# Patient Record
Sex: MALE | Race: WHITE | NOT HISPANIC OR LATINO | Employment: FULL TIME | URBAN - METROPOLITAN AREA
[De-identification: names, ages, dates, MRNs, and addresses within clinical notes are randomized per-mention and may not be internally consistent; named-entity substitution may affect disease eponyms.]

---

## 2020-01-14 ENCOUNTER — HOSPITAL ENCOUNTER (EMERGENCY)
Facility: HOSPITAL | Age: 20
Discharge: HOME | End: 2020-01-14
Attending: EMERGENCY MEDICINE | Admitting: EMERGENCY MEDICINE
Payer: COMMERCIAL

## 2020-01-14 ENCOUNTER — OFFICE VISIT (OUTPATIENT)
Dept: URGENT CARE | Facility: MEDICAL CENTER | Age: 20
End: 2020-01-14
Payer: COMMERCIAL

## 2020-01-14 VITALS
OXYGEN SATURATION: 98 % | TEMPERATURE: 98.8 F | HEIGHT: 72 IN | HEART RATE: 77 BPM | BODY MASS INDEX: 19.23 KG/M2 | SYSTOLIC BLOOD PRESSURE: 134 MMHG | DIASTOLIC BLOOD PRESSURE: 84 MMHG | RESPIRATION RATE: 18 BRPM | WEIGHT: 142 LBS

## 2020-01-14 VITALS
WEIGHT: 142 LBS | HEART RATE: 62 BPM | TEMPERATURE: 98.8 F | RESPIRATION RATE: 18 BRPM | SYSTOLIC BLOOD PRESSURE: 159 MMHG | BODY MASS INDEX: 19.26 KG/M2 | OXYGEN SATURATION: 100 % | DIASTOLIC BLOOD PRESSURE: 85 MMHG

## 2020-01-14 DIAGNOSIS — F19.10 SUBSTANCE ABUSE (HCC): Primary | ICD-10-CM

## 2020-01-14 DIAGNOSIS — R41.0 CONFUSION: Primary | ICD-10-CM

## 2020-01-14 DIAGNOSIS — T50.905A MEDICATION ADVERSE EFFECT, INITIAL ENCOUNTER: ICD-10-CM

## 2020-01-14 LAB
ALBUMIN SERPL BCP-MCNC: 4.1 G/DL (ref 3.5–5)
ALP SERPL-CCNC: 131 U/L (ref 46–116)
ALT SERPL W P-5'-P-CCNC: 24 U/L (ref 12–78)
AMPHETAMINES SERPL QL SCN: NEGATIVE
ANION GAP SERPL CALCULATED.3IONS-SCNC: 9 MMOL/L (ref 4–13)
APAP SERPL-MCNC: <2 UG/ML (ref 10–20)
AST SERPL W P-5'-P-CCNC: 20 U/L (ref 5–45)
BARBITURATES UR QL: NEGATIVE
BASOPHILS # BLD AUTO: 0.03 THOUSANDS/ΜL (ref 0–0.1)
BASOPHILS NFR BLD AUTO: 1 % (ref 0–1)
BENZODIAZ UR QL: NEGATIVE
BILIRUB SERPL-MCNC: 0.26 MG/DL (ref 0.2–1)
BUN SERPL-MCNC: 20 MG/DL (ref 5–25)
CALCIUM SERPL-MCNC: 9.3 MG/DL (ref 8.3–10.1)
CHLORIDE SERPL-SCNC: 103 MMOL/L (ref 100–108)
CK MB SERPL-MCNC: 2.2 NG/ML (ref 0–5)
CK MB SERPL-MCNC: <1 % (ref 0–2.5)
CK SERPL-CCNC: 336 U/L (ref 39–308)
CO2 SERPL-SCNC: 28 MMOL/L (ref 21–32)
COCAINE UR QL: NEGATIVE
CREAT SERPL-MCNC: 1.11 MG/DL (ref 0.6–1.3)
EOSINOPHIL # BLD AUTO: 0.04 THOUSAND/ΜL (ref 0–0.61)
EOSINOPHIL NFR BLD AUTO: 1 % (ref 0–6)
ERYTHROCYTE [DISTWIDTH] IN BLOOD BY AUTOMATED COUNT: 11.5 % (ref 11.6–15.1)
ETHANOL SERPL-MCNC: <3 MG/DL (ref 0–3)
GFR SERPL CREATININE-BSD FRML MDRD: 94 ML/MIN/1.73SQ M
GLUCOSE SERPL-MCNC: 118 MG/DL (ref 65–140)
HCT VFR BLD AUTO: 39.5 % (ref 36.5–49.3)
HGB BLD-MCNC: 13.8 G/DL (ref 12–17)
IMM GRANULOCYTES # BLD AUTO: 0.01 THOUSAND/UL (ref 0–0.2)
IMM GRANULOCYTES NFR BLD AUTO: 0 % (ref 0–2)
LYMPHOCYTES # BLD AUTO: 1.46 THOUSANDS/ΜL (ref 0.6–4.47)
LYMPHOCYTES NFR BLD AUTO: 35 % (ref 14–44)
MAGNESIUM SERPL-MCNC: 2.3 MG/DL (ref 1.6–2.6)
MCH RBC QN AUTO: 31.9 PG (ref 26.8–34.3)
MCHC RBC AUTO-ENTMCNC: 34.9 G/DL (ref 31.4–37.4)
MCV RBC AUTO: 91 FL (ref 82–98)
METHADONE UR QL: NEGATIVE
MONOCYTES # BLD AUTO: 0.53 THOUSAND/ΜL (ref 0.17–1.22)
MONOCYTES NFR BLD AUTO: 13 % (ref 4–12)
NEUTROPHILS # BLD AUTO: 2.06 THOUSANDS/ΜL (ref 1.85–7.62)
NEUTS SEG NFR BLD AUTO: 50 % (ref 43–75)
NRBC BLD AUTO-RTO: 0 /100 WBCS
OPIATES UR QL SCN: NEGATIVE
PCP UR QL: NEGATIVE
PLATELET # BLD AUTO: 221 THOUSANDS/UL (ref 149–390)
PMV BLD AUTO: 9.7 FL (ref 8.9–12.7)
POTASSIUM SERPL-SCNC: 3.9 MMOL/L (ref 3.5–5.3)
PROT SERPL-MCNC: 7.3 G/DL (ref 6.4–8.2)
RBC # BLD AUTO: 4.32 MILLION/UL (ref 3.88–5.62)
SALICYLATES SERPL-MCNC: <3 MG/DL (ref 3–20)
SODIUM SERPL-SCNC: 140 MMOL/L (ref 136–145)
THC UR QL: POSITIVE
WBC # BLD AUTO: 4.13 THOUSAND/UL (ref 4.31–10.16)

## 2020-01-14 PROCEDURE — 80307 DRUG TEST PRSMV CHEM ANLYZR: CPT | Performed by: EMERGENCY MEDICINE

## 2020-01-14 PROCEDURE — 82550 ASSAY OF CK (CPK): CPT | Performed by: EMERGENCY MEDICINE

## 2020-01-14 PROCEDURE — 99283 EMERGENCY DEPT VISIT LOW MDM: CPT

## 2020-01-14 PROCEDURE — 99213 OFFICE O/P EST LOW 20 MIN: CPT | Performed by: PHYSICIAN ASSISTANT

## 2020-01-14 PROCEDURE — 85025 COMPLETE CBC W/AUTO DIFF WBC: CPT | Performed by: EMERGENCY MEDICINE

## 2020-01-14 PROCEDURE — 80329 ANALGESICS NON-OPIOID 1 OR 2: CPT | Performed by: EMERGENCY MEDICINE

## 2020-01-14 PROCEDURE — 99284 EMERGENCY DEPT VISIT MOD MDM: CPT | Performed by: EMERGENCY MEDICINE

## 2020-01-14 PROCEDURE — 36415 COLL VENOUS BLD VENIPUNCTURE: CPT | Performed by: EMERGENCY MEDICINE

## 2020-01-14 PROCEDURE — 83735 ASSAY OF MAGNESIUM: CPT | Performed by: EMERGENCY MEDICINE

## 2020-01-14 PROCEDURE — 82553 CREATINE MB FRACTION: CPT | Performed by: EMERGENCY MEDICINE

## 2020-01-14 PROCEDURE — 80053 COMPREHEN METABOLIC PANEL: CPT | Performed by: EMERGENCY MEDICINE

## 2020-01-14 PROCEDURE — 93005 ELECTROCARDIOGRAM TRACING: CPT

## 2020-01-14 PROCEDURE — 80320 DRUG SCREEN QUANTALCOHOLS: CPT | Performed by: EMERGENCY MEDICINE

## 2020-01-14 NOTE — PROGRESS NOTES
[address]        NAME: Rochelle Shukla is a 22 y o  male  : 2000    MRN: [id number]  DATE: 2020  TIME: 6:49 PM    Assessment and Plan   Confusion [R41 0]  1  Confusion           Patient Instructions      confusion  Patient referred to ER for further evaluation  Follow up with PCP in 3-5 days  Proceed to  ER if symptoms worsen  Chief Complaint     Chief Complaint   Patient presents with    Confusion     Pt states he took Zoloft x 3 days to get high along with marijuana: 200mg, 300mg, 200mg  Pt states since then, he has confusion, muscle spasms, and restlessness  Pt also experienced diarrhea and muscle twitching which pt states, has resolved  History of Present Illness       23 y/o male presents c/o muscle twitching, fatigue and confusion x 4 days  States he had been taking zoloft and marijuana 5 days ago  Patient states that due to the muscle spasms he left work today  Denies chest pain, SOB, fever, diaphoresis, fever         Review of Systems   Review of Systems   Constitutional: Negative  HENT: Negative  Eyes: Negative  Respiratory: Negative  Negative for apnea, cough, choking, chest tightness, shortness of breath, wheezing and stridor  Cardiovascular: Negative  Negative for chest pain  Psychiatric/Behavioral: Positive for confusion  Current Medications     No current outpatient medications on file  Current Allergies     Allergies as of 2020    (No Known Allergies)            The following portions of the patient's history were reviewed and updated as appropriate: allergies, current medications, past family history, past medical history, past social history, past surgical history and problem list      History reviewed  No pertinent past medical history  History reviewed  No pertinent surgical history      Family History   Problem Relation Age of Onset    No Known Problems Mother     Hypertension Father     Cancer Father Medications have been verified  Objective   /84   Pulse 77   Temp 98 8 °F (37 1 °C) (Temporal)   Resp 18   Ht 6' (1 829 m)   Wt 64 4 kg (142 lb)   SpO2 98%   BMI 19 26 kg/m²        Physical Exam     Physical Exam   Constitutional: He is oriented to person, place, and time  He appears well-developed and well-nourished  No distress  HENT:   Head: Normocephalic and atraumatic  Neck: Normal range of motion  Neck supple  Lymphadenopathy:     He has cervical adenopathy  Neurological: He is alert and oriented to person, place, and time  He is not disoriented  No cranial nerve deficit or sensory deficit  He exhibits normal muscle tone  Coordination normal  GCS eye subscore is 4  GCS verbal subscore is 5  GCS motor subscore is 6  Skin: He is not diaphoretic

## 2020-01-15 LAB
ATRIAL RATE: 65 BPM
P AXIS: 21 DEGREES
PR INTERVAL: 140 MS
QRS AXIS: 90 DEGREES
QRSD INTERVAL: 86 MS
QT INTERVAL: 406 MS
QTC INTERVAL: 422 MS
T WAVE AXIS: 25 DEGREES
VENTRICULAR RATE: 65 BPM

## 2020-01-15 PROCEDURE — 93010 ELECTROCARDIOGRAM REPORT: CPT | Performed by: INTERNAL MEDICINE

## 2020-01-15 NOTE — ED PROVIDER NOTES
History  Chief Complaint   Patient presents with    Recreational Drug Use     per pt  "he and his friends were smoking Marijuana and taking Zoloft from Wednesday through Saturday, and it is making him to feel shakey and wierd so he came in to be seen, pt is not prescribed Zoloft  "       History provided by:  Patient   used: No    55-year-old otherwise healthy male presented for evaluation of uncomfortable symptoms after abusing Zoloft and marijuana this week  States he has been smoking marijuana, taking extra Zoloft which he is not prescribed and has been feeling jittery, anxious, feels more forgetful, and is having some sluggish thoughts  States his friend was doing the same thing and was apparently diagnosed with serotonin syndrome earlier today although he reports that he was discharged from the hospital without any further treatment  Patient is afebrile  Well appearing overall on exam with normal vitals  He has a normal neurologic exam   Heart/lung sounds appropriate  None       History reviewed  No pertinent past medical history  History reviewed  No pertinent surgical history  Family History   Problem Relation Age of Onset    No Known Problems Mother     Hypertension Father     Cancer Father      I have reviewed and agree with the history as documented  Social History     Tobacco Use    Smoking status: Never Smoker    Smokeless tobacco: Never Used   Substance Use Topics    Alcohol use: Yes     Comment: occasionally    Drug use: Yes     Types: Marijuana     Comment: last smoked 3 days ago        Review of Systems   Constitutional: Negative for activity change, appetite change, fatigue and fever  Respiratory: Negative for chest tightness and shortness of breath  Gastrointestinal: Negative for abdominal pain, nausea and vomiting  Musculoskeletal: Negative for back pain, myalgias and neck pain  Skin: Negative for color change and rash     Neurological: Negative for weakness and numbness  Psychiatric/Behavioral: The patient is nervous/anxious  All other systems reviewed and are negative  Physical Exam  Physical Exam   Constitutional: He is oriented to person, place, and time  He appears well-developed and well-nourished  No distress  HENT:   Head: Normocephalic  Mouth/Throat: Oropharynx is clear and moist    Cardiovascular: Normal rate, regular rhythm, normal heart sounds and intact distal pulses  Pulmonary/Chest: Effort normal and breath sounds normal    Musculoskeletal: Normal range of motion  He exhibits no edema  Neurological: He is alert and oriented to person, place, and time  No sensory deficit  He exhibits normal muscle tone  Skin: Skin is warm and dry  Psychiatric: He has a normal mood and affect  His behavior is normal    Nursing note and vitals reviewed  Vital Signs  ED Triage Vitals [01/14/20 1954]   Temperature Pulse Respirations Blood Pressure SpO2   98 8 °F (37 1 °C) 77 18 159/85 97 %      Temp Source Heart Rate Source Patient Position - Orthostatic VS BP Location FiO2 (%)   Oral Monitor Sitting Left arm --      Pain Score       --           Vitals:    01/14/20 1954 01/14/20 2115   BP: 159/85    Pulse: 77 62   Patient Position - Orthostatic VS: Sitting          Visual Acuity      ED Medications  Medications - No data to display    Diagnostic Studies  Results Reviewed     Procedure Component Value Units Date/Time    Rapid drug screen, urine [[id number]]  (Abnormal) Collected:  01/14/20 2141    Lab Status:  Final result Specimen:  Urine, Clean Catch Updated:  01/14/20 2156     Amph/Meth UR Negative     Barbiturate Ur Negative     Benzodiazepine Urine Negative     Cocaine Urine Negative     Methadone Urine Negative     Opiate Urine Negative     PCP Ur Negative     THC Urine Positive    Narrative:       Presumptive report  If requested, specimen will be sent to reference lab for confirmation  FOR MEDICAL PURPOSES ONLY     IF CONFIRMATION NEEDED PLEASE CONTACT THE LAB WITHIN 5 DAYS      Drug Screen Cutoff Levels:  AMPHETAMINE/METHAMPHETAMINES  1000 ng/mL  BARBITURATES     200 ng/mL  BENZODIAZEPINES     200 ng/mL  COCAINE      300 ng/mL  METHADONE      300 ng/mL  OPIATES      300 ng/mL  PHENCYCLIDINE     25 ng/mL  THC       50 ng/mL      CKMB [[id number]]  (Normal) Collected:  01/14/20 2050    Lab Status:  Final result Specimen:  Blood from Arm, Right Updated:  01/14/20 2141     CK-MB Index <1 0 %      CK-MB 2 2 ng/mL     Magnesium [[id number]]  (Normal) Collected:  01/14/20 2050    Lab Status:  Final result Specimen:  Blood from Arm, Right Updated:  01/14/20 2138     Magnesium 2 3 mg/dL     CK Total with Reflex CKMB [[id number]]  (Abnormal) Collected:  01/14/20 2050    Lab Status:  Final result Specimen:  Blood from Arm, Right Updated:  01/14/20 2137     Total  U/L     Comprehensive metabolic panel [[id number]]  (Abnormal) Collected:  01/14/20 2050    Lab Status:  Final result Specimen:  Blood from Arm, Right Updated:  01/14/20 2121     Sodium 140 mmol/L      Potassium 3 9 mmol/L      Chloride 103 mmol/L      CO2 28 mmol/L      ANION GAP 9 mmol/L      BUN 20 mg/dL      Creatinine 1 11 mg/dL      Glucose 118 mg/dL      Calcium 9 3 mg/dL      AST 20 U/L      ALT 24 U/L      Alkaline Phosphatase 131 U/L      Total Protein 7 3 g/dL      Albumin 4 1 g/dL      Total Bilirubin 0 26 mg/dL      eGFR 94 ml/min/1 73sq m     Narrative:       Chung guidelines for Chronic Kidney Disease (CKD):     Stage 1 with normal or high GFR (GFR > 90 mL/min/1 73 square meters)    Stage 2 Mild CKD (GFR = 60-89 mL/min/1 73 square meters)    Stage 3A Moderate CKD (GFR = 45-59 mL/min/1 73 square meters)    Stage 3B Moderate CKD (GFR = 30-44 mL/min/1 73 square meters)    Stage 4 Severe CKD (GFR = 15-29 mL/min/1 73 square meters)    Stage 5 End Stage CKD (GFR <15 mL/min/1 73 square meters)  Note: GFR calculation is accurate only with a steady state creatinine    Salicylate level [[id number]]  (Abnormal) Collected:  01/14/20 2050    Lab Status:  Final result Specimen:  Blood from Arm, Right Updated:  50/07/97 9117     Salicylate Lvl <3 mg/dL     Acetaminophen level-If concentration is detectable, please discuss with medical  on call   [[id number]]  (Abnormal) Collected:  01/14/20 2050    Lab Status:  Final result Specimen:  Blood from Arm, Right Updated:  01/14/20 2119     Acetaminophen Level <2 ug/mL     Ethanol [[id number]]  (Normal) Collected:  01/14/20 2050    Lab Status:  Final result Specimen:  Blood from Arm, Right Updated:  01/14/20 2115     Ethanol Lvl <3 mg/dL     CBC and differential [[id number]]  (Abnormal) Collected:  01/14/20 2050    Lab Status:  Final result Specimen:  Blood from Arm, Right Updated:  01/14/20 2101     WBC 4 13 Thousand/uL      RBC 4 32 Million/uL      Hemoglobin 13 8 g/dL      Hematocrit 39 5 %      MCV 91 fL      MCH 31 9 pg      MCHC 34 9 g/dL      RDW 11 5 %      MPV 9 7 fL      Platelets 623 Thousands/uL      nRBC 0 /100 WBCs      Neutrophils Relative 50 %      Immat GRANS % 0 %      Lymphocytes Relative 35 %      Monocytes Relative 13 %      Eosinophils Relative 1 %      Basophils Relative 1 %      Neutrophils Absolute 2 06 Thousands/µL      Immature Grans Absolute 0 01 Thousand/uL      Lymphocytes Absolute 1 46 Thousands/µL      Monocytes Absolute 0 53 Thousand/µL      Eosinophils Absolute 0 04 Thousand/µL      Basophils Absolute 0 03 Thousands/µL                  No orders to display              Procedures  ECG 12 Lead Documentation Only  Date/Time: 1/14/2020 9:00 PM  Performed by: Kota Quesada MD  Authorized by: Kota Quesada MD     Indications / Diagnosis:  Zoloft overdose  ECG reviewed by me, the ED Provider: yes    Patient location:  ED  Rate:     ECG rate:  65  Rhythm:     Rhythm: sinus rhythm    Ectopy:     Ectopy: none    QRS:     QRS axis:  Normal  Conduction:     Conduction: normal    ST segments:     ST segments:  Normal  T waves:     T waves: normal               ED Course                               MDM  Number of Diagnoses or Management Options  Medication adverse effect, initial encounter: new and requires workup  Substance abuse [facility]): new and requires workup  Diagnosis management comments: 55-year-old male presented for evaluation of feeling anxious, some forgetfulness in the setting taking Zoloft for recreation over the last few days as well as smoking marijuana  He became very concerned because his friend was apparently diagnosed with serotonin syndrome although this is unlikely because he notes that his friend was discharged from the emergency department  Patient is afebrile  Has no neurologic deficits  No myoclonus  Lab work unremarkable  Suspect adverse effects from the medication  Advised discontinuing recreational use  Stable for discharge  Amount and/or Complexity of Data Reviewed  Clinical lab tests: ordered and reviewed    Patient Progress  Patient progress: stable        Disposition  Final diagnoses:   Substance abuse ([facility] [address] )   Medication adverse effect, initial encounter     Time reflects when diagnosis was documented in both MDM as applicable and the Disposition within this note     Time User Action Codes Description Comment    1/14/2020  9:42 PM Isha SKELTON Add [F19 10] Substance abuse ([facility] [address] )     1/14/2020  9:42 PM [facility] [address] Medication adverse effect, initial encounter       ED Disposition     ED Disposition Condition Date/Time Comment    Discharge Stable Tue Jan 14, 2020  9:42 PM Félix Grounds discharge to home/self care              Follow-up Information     Follow up With Specialties Details Why Contact Info Additional Information    [facility]   [address] [address]  [phone]  ED, [facility] [address] [place] [address], [address]          There are no discharge medications for this patient  No discharge procedures on file      ED Provider  Electronically Signed by           Adriano Vega MD  01/14/20 5425

## 2020-02-21 VITALS
BODY MASS INDEX: 19.77 KG/M2 | HEIGHT: 72 IN | HEART RATE: 106 BPM | WEIGHT: 146 LBS | DIASTOLIC BLOOD PRESSURE: 61 MMHG | SYSTOLIC BLOOD PRESSURE: 168 MMHG

## 2020-02-21 DIAGNOSIS — M76.32 IT BAND SYNDROME, LEFT: Primary | ICD-10-CM

## 2020-02-21 PROCEDURE — 99203 OFFICE O/P NEW LOW 30 MIN: CPT | Performed by: ORTHOPAEDIC SURGERY

## 2020-02-21 RX ORDER — MELOXICAM 15 MG/1
15 TABLET ORAL DAILY
Qty: 30 TABLET | Refills: 0 | Status: SHIPPED | OUTPATIENT
Start: 2020-02-21 | End: 2021-02-15

## 2020-02-21 NOTE — PROGRESS NOTES
Patient Name:  Jerald Meeks  MRN:  [id number]    Assessment & Plan     Left Knee, IT band syndrome    1  Will prescribe formal physical therapy for the above mentioned diagnosis and supplement with a home exercise program  2  Mobic once daily was prescribed for the patient for pain relief  3  Wear hinged knee brace as needed  4  Follow up in 4 weeks for re evaluation of symptoms  Consider MRI if symptoms persist    Chief Complaint     Left Knee Pain    History of the Present Illness     Jerald Meeks is a 22 y o  male presents with a chief complaint of left knee pain  The pain began several year(s) ago and is not associated with an acute injury  Patient states that he does work at [employer] and constantly squats and lifts product  He also plays basketball  The patient describes the pain as aching and dull and 3 out of 10 in intensity  It is intermittent in timing, and localizes the pain to the IT band, globally  The pain is worse with activities, ascending stairs, descending stairs and squatting and relieved with rest, ice, the use of a brace/sleeve, avoiding painful activities  He denies mechanical symptoms such as locking and catching  He denies instability of the knee  Patient has not had recent treatment  Patient does state that he was treated in the past for patellar tendinitis and wear a patellar tendon strap with benefit while he plays basketball  Physical Exam     /61   Pulse (!) 106   Ht 6' (1 829 m)   Wt 66 2 kg (146 lb)   BMI 19 80 kg/m²     Left  knee:  Soft tissue swelling: None  No erythema  Effusion: None  Tenderness to palpation: None  Range of motion:  Extension: 0  Flexion: 130  Lachman test: Stable  Valgus stress: Stable  Varus stress: Stable  Posterior drawer test: Stable  Jennifer's test: Negative  Patellar grind test: Negative    Eyes: No scleral icterus  Neck: Supple  Lungs: Normal respiratory effort    Cardiovascular: Capillary refill is less than 2 seconds  Skin: Intact without erythema  Neurologic: Sensation intact to light touch  Psychiatric: Mood and affect are appropriate  History reviewed  No pertinent past medical history  History reviewed  No pertinent surgical history  No Known Allergies    No current outpatient medications on file prior to visit  No current facility-administered medications on file prior to visit  Social History     Tobacco Use    Smoking status: Never Smoker    Smokeless tobacco: Never Used   Substance Use Topics    Alcohol use: Yes     Comment: occasionally    Drug use: Yes     Types: Marijuana     Comment: last smoked 3 days ago       Family History   Problem Relation Age of Onset    No Known Problems Mother     Hypertension Father     Cancer Father        Review of Systems     As stated in the HPI  All other systems were reviewed and are negative        Scribe Attestation    I,:   Marla Castellanos am acting as a scribe while in the presence of the attending physician :        I,:   Adolfo Gowers, MD personally performed the services described in this documentation    as scribed in my presence :

## 2021-01-25 ENCOUNTER — APPOINTMENT (OUTPATIENT)
Dept: RADIOLOGY | Age: 21
End: 2021-01-25
Payer: COMMERCIAL

## 2021-01-25 ENCOUNTER — OFFICE VISIT (OUTPATIENT)
Dept: PODIATRY | Facility: CLINIC | Age: 21
End: 2021-01-25
Payer: COMMERCIAL

## 2021-01-25 VITALS — HEIGHT: 72 IN | BODY MASS INDEX: 19.77 KG/M2 | WEIGHT: 146 LBS

## 2021-01-25 DIAGNOSIS — M79.675 PAIN IN TOE OF LEFT FOOT: ICD-10-CM

## 2021-01-25 DIAGNOSIS — M20.12 HALLUX ABDUCTO VALGUS, LEFT: ICD-10-CM

## 2021-01-25 DIAGNOSIS — M20.12 HALLUX ABDUCTO VALGUS, LEFT: Primary | ICD-10-CM

## 2021-01-25 PROCEDURE — 73620 X-RAY EXAM OF FOOT: CPT

## 2021-01-25 PROCEDURE — 99203 OFFICE O/P NEW LOW 30 MIN: CPT | Performed by: PODIATRIST

## 2021-01-25 NOTE — PROGRESS NOTES
Assessment/Plan:         Diagnoses and all orders for this visit:    Hallux abducto valgus, left  Comments:  IPJ deformity  Likely fusion IPJ vs correction  Send for XRays  Orders:  -     XR foot 2 vw left; Future  -     Case request operating room: ARTHRODESIS / [place] left great toe interphalangeal joint; Standing  -     Case request operating room: ARTHRODESIS / [place] left great toe interphalangeal joint    Pain in toe of left foot  -     XR foot 2 vw left; Future  -     Case request operating room: ARTHRODESIS / Jayde [place] left great toe interphalangeal joint; Standing  -     Case request operating room: ARTHRODESIS / [place]de [place] left great toe interphalangeal joint    Other orders  -     Incentive spirometry; Standing  -     Insert and maintain IV line; Standing  -     Void On-Call to O R ; Standing  -     ceFAZolin (ANCEF) 1,000 mg in dextrose 5 % 100 mL IVPB      Patient presents with 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  He has significant abductus valgus at the interphalangeal joint  This is a rigid deformity  Realistically his only option other than dealing with the pain would be to Orthopedic we straighten the toe  Patient would like to discuss with his bipolar it regarding timing of surgery  I explained he would need to be out of work for probably 6 weeks while in a boot  Patient is sent for weight-bearing x-rays  He will call if he has decided upon surgical correction  Subjective:      Patient ID: Félix Dina is a 23 y o  male  21y/o male presents with "ligament pain" in his foot  HE also reports IT band sydrome for which he is in physical therapy and seeing orthopedics  He works at [employer] and his job involves lifting and squatting, carrying heavy boxes, etc  HE reports no significant PMH  His big toes are crooked  They have been causing some pain  The left is more exaggerated and more painful  Most of the pain is on the bony bump   HE has had this for years but as he's gone to work they bother him more  The following portions of the patient's history were reviewed and updated as appropriate: allergies, current medications, past family history, past medical history, past social history, past surgical history and problem list     Review of Systems   Constitutional: Negative  HENT: Negative for sinus pressure and sinus pain  Respiratory: Negative for cough and shortness of breath  Cardiovascular: Negative for chest pain and leg swelling  Gastrointestinal: Negative for diarrhea, nausea and vomiting  Musculoskeletal: Positive for arthralgias and joint swelling  Skin: Negative for color change and wound  Neurological: Negative for weakness and numbness  Objective:      Ht 6' (1 829 m)   Wt 66 2 kg (146 lb)   BMI 19 80 kg/m²          Physical Exam      Vitals reviewed    Constitutional: Patient is not distressed  Patient is well developed  Patient is not obese  Vascular: Dorsalis pedis and posterior tibial pulses palpable  Capillary refill time within normal limits to all digits  No erythema  No edema  No significant varicosities  Dermatology: No rash  No open lesions  Present pedal hair  Skin has healthy turgor  Musculoskeletal: Normal range of motion to ankle, subtalar joint, and midtarsal joint  Normal range of motion first MTPJ  Manual muscle testing 5 out of 5 for inversion/eversion/dorsiflexion/plantarflexion  On stance patients feet are generally rectus  Rigid left hallux IPJ abductus  NO MTPJ instability  THe IPJ has no crepitus, just large rigid abductus varus deformity  THe medial IPJ is very prominent  Neurological: Monofilament sensation is intact  Vibratory sensation is intact  Achilles reflex is normal    Proprioception is normal    Respiratory: Normal respiratory effort, no distress    Psych: Patient is AAOx3  Normal mood       Lymphatic: nonpalpable popliteal lymph nodes  Nonpalpable groin lymph nodes

## 2021-01-27 RX ORDER — CEFAZOLIN SODIUM 1 G/50ML
1000 SOLUTION INTRAVENOUS ONCE
Status: CANCELLED | OUTPATIENT
Start: 2021-03-05

## 2021-02-08 ENCOUNTER — TELEPHONE (OUTPATIENT)
Dept: OBGYN CLINIC | Facility: HOSPITAL | Age: 21
End: 2021-02-08

## 2021-02-08 ENCOUNTER — OFFICE VISIT (OUTPATIENT)
Dept: PODIATRY | Facility: CLINIC | Age: 21
End: 2021-02-08
Payer: COMMERCIAL

## 2021-02-08 VITALS — BODY MASS INDEX: 19.64 KG/M2 | HEIGHT: 72 IN | WEIGHT: 145 LBS

## 2021-02-08 DIAGNOSIS — M20.12 HALLUX ABDUCTO VALGUS, LEFT: Primary | ICD-10-CM

## 2021-02-08 DIAGNOSIS — M20.32 ACQUIRED HALLUX MALLEUS OF LEFT FOOT: ICD-10-CM

## 2021-02-08 PROCEDURE — 99213 OFFICE O/P EST LOW 20 MIN: CPT | Performed by: PODIATRIST

## 2021-02-08 NOTE — H&P (VIEW-ONLY)
Assessment/Plan:         Diagnoses and all orders for this visit:    Hallux abducto valgus, left    Acquired hallux malleus of left foot        Diagnosis and options discussed  Consent was signed for fusion of left interphalangeal joint of the great toe  We will have to do angular correction to the deformity in both the transverse and frontal plane  I explained the procedure in detail and postop recovery as can best be predicted  Alternatives, risks, complications discussed  Questions answered  Discussed appropriate use of narcotic medication and the risks of these medications  Explained to the patient he will be in a boot following surgery for approximately 6 weeks  Patient does not need DVT prophylaxis for this procedure as he will be weight-bearing but we did discuss signs and symptoms of blood clot/pulmonary embolism  Extra time needed today's visit for counseling  Subjective:      Patient ID: Nhi Kim is a 23 y o  male  19y/o male presents with "ligament pain" in his foot  HE also reports IT band sydrome for which he is in physical therapy and seeing orthopedics  He works at [employer] and his job involves lifting and squatting, carrying heavy boxes, etc  HE reports no significant PMH      His big toes are crooked  They have been causing some pain  The left is more exaggerated and more painful  Most of the pain is on the bony bump  HE has had this for years but as he's gone to work they bother him more  UPDATE 2/8/21:  Patient did call to planned surgery from March 5th  He would like to see is x-rays and has some questions regarding the surgery  We will also sign consent today  He reports no other changes in still is getting 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        The following portions of the patient's history were reviewed and updated as appropriate: allergies, current medications, past family history, past medical history, past social history, past surgical history and problem list     Review of Systems   Constitutional: Negative  HENT: Negative for sinus pressure and sinus pain  Respiratory: Negative for cough and shortness of breath  Cardiovascular: Negative for chest pain and leg swelling  Gastrointestinal: Negative for diarrhea, nausea and vomiting  Musculoskeletal: Positive for arthralgias and joint swelling  Skin: Negative for color change and wound  Neurological: Negative for weakness and numbness  Psychiatric/Behavioral: The patient is nervous/anxious  Objective:      Ht 6' (1 829 m)   Wt 65 8 kg (145 lb)   BMI 19 67 kg/m²          Physical Exam    Vitals reviewed    Constitutional: Patient is not distressed  Patient is well developed  Patient is   Not obese  Vascular: Dorsalis pedis and posterior tibial pulses palpable  Capillary refill time within normal limits to all digits  No erythema  No edema  No significant varicosities  Dermatology: No rash  No open lesions  Present pedal hair  Skin has healthy turgor  Musculoskeletal: Normal range of motion to ankle, subtalar joint, and midtarsal joint  Normal range of motion first MTPJ  Manual muscle testing 5 out of 5 for inversion/eversion/dorsiflexion/plantarflexion  On stance patients feet are generally rectus  Bilateral great toe interphalangeal joint shows subluxed lateral   Valgusdeviation of the distal phalanx  This deformity is rigid  There is no crepitus with range of motion but the deformity is moderate to severe  The great toe is rubbing against the 2nd digit distally  Neurological: Monofilament sensation is intact  Vibratory sensation is intact  Achilles reflex is normal    Proprioception is normal    Respiratory: Normal respiratory effort, no distress    Psych: Patient is AAOx3  Normal mood       Lymphatic: nonpalpable popliteal lymph nodes  Nonpalpable groin lymph nodes      XRay  two views of the  Left foot personally read by   Juanjose Ordonez in office today and discussed with patient:  1   the distal phalanx is abducted and valgus rotation with approximately 50% subluxation of the interphalangeal joint  No significant  Sagittal plane deformity,  All deformity seems to be transverse and frontal   I did review these findings with the patient personally

## 2021-02-08 NOTE — PROGRESS NOTES
Assessment/Plan:         Diagnoses and all orders for this visit:    Hallux abducto valgus, left    Acquired hallux malleus of left foot        Diagnosis and options discussed  Consent was signed for fusion of left interphalangeal joint of the great toe  We will have to do angular correction to the deformity in both the transverse and frontal plane  I explained the procedure in detail and postop recovery as can best be predicted  Alternatives, risks, complications discussed  Questions answered  Discussed appropriate use of narcotic medication and the risks of these medications  Explained to the patient he will be in a boot following surgery for approximately 6 weeks  Patient does not need DVT prophylaxis for this procedure as he will be weight-bearing but we did discuss signs and symptoms of blood clot/pulmonary embolism  Extra time needed today's visit for counseling  Subjective:      Patient ID: Beverly Connolly is a 23 y o  male  21y/o male presents with "ligament pain" in his foot  HE also reports IT band sydrome for which he is in physical therapy and seeing orthopedics  He works at [employer] and his job involves lifting and squatting, carrying heavy boxes, etc  HE reports no significant PMH      His big toes are crooked  They have been causing some pain  The left is more exaggerated and more painful  Most of the pain is on the bony bump  HE has had this for years but as he's gone to work they bother him more  UPDATE 2/8/21:  Patient did call to planned surgery from March 5th  He would like to see is x-rays and has some questions regarding the surgery  We will also sign consent today  He reports no other changes in still is getting 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        The following portions of the patient's history were reviewed and updated as appropriate: allergies, current medications, past family history, past medical history, past social history, past surgical history and problem list     Review of Systems   Constitutional: Negative  HENT: Negative for sinus pressure and sinus pain  Respiratory: Negative for cough and shortness of breath  Cardiovascular: Negative for chest pain and leg swelling  Gastrointestinal: Negative for diarrhea, nausea and vomiting  Musculoskeletal: Positive for arthralgias and joint swelling  Skin: Negative for color change and wound  Neurological: Negative for weakness and numbness  Psychiatric/Behavioral: The patient is nervous/anxious  Objective:      Ht 6' (1 829 m)   Wt 65 8 kg (145 lb)   BMI 19 67 kg/m²          Physical Exam    Vitals reviewed    Constitutional: Patient is not distressed  Patient is well developed  Patient is   Not obese  Vascular: Dorsalis pedis and posterior tibial pulses palpable  Capillary refill time within normal limits to all digits  No erythema  No edema  No significant varicosities  Dermatology: No rash  No open lesions  Present pedal hair  Skin has healthy turgor  Musculoskeletal: Normal range of motion to ankle, subtalar joint, and midtarsal joint  Normal range of motion first MTPJ  Manual muscle testing 5 out of 5 for inversion/eversion/dorsiflexion/plantarflexion  On stance patients feet are generally rectus  Bilateral great toe interphalangeal joint shows subluxed lateral   Valgusdeviation of the distal phalanx  This deformity is rigid  There is no crepitus with range of motion but the deformity is moderate to severe  The great toe is rubbing against the 2nd digit distally  Neurological: Monofilament sensation is intact  Vibratory sensation is intact  Achilles reflex is normal    Proprioception is normal    Respiratory: Normal respiratory effort, no distress    Psych: Patient is AAOx3  Normal mood       Lymphatic: nonpalpable popliteal lymph nodes  Nonpalpable groin lymph nodes      XRay  two views of the  Left foot personally read by   Leida Lima in office today and discussed with patient:  1   the distal phalanx is abducted and valgus rotation with approximately 50% subluxation of the interphalangeal joint  No significant  Sagittal plane deformity,  All deformity seems to be transverse and frontal   I did review these findings with the patient personally

## 2021-02-15 ENCOUNTER — CONSULT (OUTPATIENT)
Dept: INTERNAL MEDICINE CLINIC | Facility: CLINIC | Age: 21
End: 2021-02-15
Payer: COMMERCIAL

## 2021-02-15 VITALS
WEIGHT: 144.8 LBS | OXYGEN SATURATION: 99 % | TEMPERATURE: 97.7 F | HEART RATE: 80 BPM | SYSTOLIC BLOOD PRESSURE: 120 MMHG | HEIGHT: 72 IN | DIASTOLIC BLOOD PRESSURE: 70 MMHG | BODY MASS INDEX: 19.61 KG/M2

## 2021-02-15 DIAGNOSIS — Z01.818 PRE-OP EVALUATION: Primary | ICD-10-CM

## 2021-02-15 DIAGNOSIS — M20.12 HALLUX ABDUCTO VALGUS, LEFT: ICD-10-CM

## 2021-02-15 PROCEDURE — 99242 OFF/OP CONSLTJ NEW/EST SF 20: CPT | Performed by: NURSE PRACTITIONER

## 2021-02-15 NOTE — PROGRESS NOTES
Assessment/Plan:    Pre-op evaluation  Patient cleared for surgery  Will have staff fax consult  Hallux abducto valgus, left  Scheduled for surgery on 3/5/21  Diagnoses and all orders for this visit:    Pre-op evaluation    Hallux abducto valgus, left          Subjective:    Lynne Otero is a 23y o  year old male who presents to the office today for a preoperative consultation at the request of surgeon Dr Rufus Hardy who plans on performing surgery on Hallux abducto valgus of left foot on March 5  This consultation is requested for the specific conditions prompting preoperative evaluation (i e  because of potential affect on operative risk)  Planned anesthesia: general  The patient has the following known anesthesia issues: denies  Patients bleeding risk: no recent abnormal bleeding  Patient does not have objections to receiving blood products if needed  Patient is able to walk 4 blocks without symptoms  Patient is able to walk up 2 flights of stairs without symptoms  Significant past medical history includes:  Patient denies any PMH  Tobacco use: denies  Alcohol use: socially  Illicit drug use: denies    Symptoms:   Easy bleeding: no  Easy bruising: no  Frequent nose bleeds: no  Chest pain: no  Cough: no  Dyspnea on exertion:no  Edema: no  Palpitations: no  Wheezing: no    Living situation: Patient lives with his girl friend in a one story home  His girlfriend will be caring for him after surgery  hedoes not have post-op concerns  The following portions of the patient's history were reviewed and updated as appropriate: allergies, current medications, past family history, past medical history, past social history, past surgical history and problem list     Review of Systems  Review of Systems   Constitutional: Negative for activity change, appetite change, chills, diaphoresis and fever     HENT: Negative for congestion, ear discharge, ear pain, postnasal drip, rhinorrhea, sinus pressure, sinus pain and sore throat  Eyes: Negative for pain, discharge, itching and visual disturbance  Respiratory: Negative for cough, chest tightness, shortness of breath and wheezing  Cardiovascular: Negative for chest pain, palpitations and leg swelling  Gastrointestinal: Negative for abdominal pain, blood in stool, constipation, diarrhea, nausea and vomiting  Endocrine: Negative for polydipsia, polyphagia and polyuria  Genitourinary: Negative for difficulty urinating, dysuria, hematuria and urgency  Musculoskeletal: Negative for arthralgias, back pain and neck pain  Skin: Negative for rash and wound  Neurological: Negative for dizziness, weakness, numbness and headaches  Past Medical History:   Diagnosis Date    Hallux abducto valgus, left      Past Surgical History:   Procedure Laterality Date    WISDOM TOOTH EXTRACTION       Social History     Tobacco Use    Smoking status: Never Smoker    Smokeless tobacco: Never Used   Substance Use Topics    Alcohol use: Yes     Alcohol/week: 3 0 standard drinks     Types: 3 Glasses of wine per week     Frequency: 2-4 times a month     Drinks per session: 1 or 2     Binge frequency: Never     Comment: weekly    Drug use: Not Currently     Types: Marijuana     Comment: last smoked in the past month     Family History   Problem Relation Age of Onset    Arthritis Mother     Hypertension Father     Cancer Father      Patient has no known allergies  Current Outpatient Medications   Medication Sig Dispense Refill    Multiple Vitamin (multivitamin) tablet Take 1 tablet by mouth daily       No current facility-administered medications for this visit          Objective:       /70 (BP Location: Right arm, Patient Position: Sitting, Cuff Size: Adult)   Pulse 80   Temp 97 7 °F (36 5 °C) (Tympanic)   Ht 6' 0 13" (1 832 m)   Wt 65 7 kg (144 lb 12 8 oz)   SpO2 99% Comment: Room air  BMI 19 57 kg/m²   Physical Exam  Constitutional: General: He is not in acute distress  Appearance: He is well-developed  He is not diaphoretic  HENT:      Head: Normocephalic and atraumatic  Right Ear: External ear normal       Left Ear: External ear normal       Nose: Nose normal       Mouth/Throat:      Pharynx: No oropharyngeal exudate  Eyes:      General:         Right eye: No discharge  Left eye: No discharge  Conjunctiva/sclera: Conjunctivae normal       Pupils: Pupils are equal, round, and reactive to light  Neck:      Musculoskeletal: Normal range of motion and neck supple  Thyroid: No thyromegaly  Cardiovascular:      Rate and Rhythm: Normal rate and regular rhythm  Heart sounds: Normal heart sounds  No murmur  No friction rub  No gallop  Pulmonary:      Effort: Pulmonary effort is normal  No respiratory distress  Breath sounds: Normal breath sounds  No stridor  No wheezing or rales  Abdominal:      General: Bowel sounds are normal  There is no distension  Palpations: Abdomen is soft  Tenderness: There is no abdominal tenderness  Lymphadenopathy:      Cervical: No cervical adenopathy  Skin:     General: Skin is warm and dry  Findings: No erythema or rash  Neurological:      Mental Status: He is alert and oriented to person, place, and time  Psychiatric:         Behavior: Behavior normal          Thought Content: Thought content normal          Judgment: Judgment normal               Cardiographics  ECG: N/A    Lab Review   not applicable     Assessment:     23 y o  male with planned surgery as above  Known risk factors for perioperative complications: None      Cardiac Risk Estimation:     Wisam Adams is cleared from a cardiovascular standpoint to proceed with surgery  He is not risk from a cardiovascular standpoint at this time without any additional cardiac testing  Reevaluation needed if he should present with symptoms prior to surgery        Plan:  Preoperative workup as follows none  Change in medication regimen before surgery: As per surgeon

## 2021-02-22 ENCOUNTER — TELEPHONE (OUTPATIENT)
Dept: INTERNAL MEDICINE CLINIC | Facility: CLINIC | Age: 21
End: 2021-02-22

## 2021-02-25 ENCOUNTER — TELEPHONE (OUTPATIENT)
Dept: INTERNAL MEDICINE CLINIC | Facility: CLINIC | Age: 21
End: 2021-02-25

## 2021-02-25 RX ORDER — MULTIVITAMIN
1 TABLET ORAL DAILY
COMMUNITY
End: 2022-04-25

## 2021-02-25 NOTE — TELEPHONE ENCOUNTER
Hazel Dorman from podiatry office called to clarify that he did or did not have a ekg done  It states in your note it was reviewed  But no ekg done in our office   Please call pam back at [phone]

## 2021-02-25 NOTE — PRE-PROCEDURE INSTRUCTIONS
Pre-Surgery Instructions:   Medication Instructions    Multiple Vitamin (multivitamin) tablet Instructed patient per Anesthesia Guidelines  All pre-op instructions per [facility] My Surgical Experience w/ pt verb understanding  Knows NPO after MN  Inst no ASA, NSAIDs , otc vit/supp for 7 days pre-op  Inst no etoh or marijuana for 24 hrs before sx  Has showering instructions from , office, inst to get CHG , reviewed process at time of phone call  Enc to wear comfortable easy on/easy of clothing on DOS, abhijit his pants, eg  loose sweat pants ideal to get over foot dsg  All pt questions answered to his verb understanding  States no further concerns at this time

## 2021-02-26 NOTE — TELEPHONE ENCOUNTER
No EKG was done please call and notify  Also patient did not establish care yet, please remove me as PCP

## 2021-03-04 ENCOUNTER — ANESTHESIA EVENT (OUTPATIENT)
Dept: PERIOP | Facility: HOSPITAL | Age: 21
End: 2021-03-04
Payer: COMMERCIAL

## 2021-03-05 ENCOUNTER — HOSPITAL ENCOUNTER (OUTPATIENT)
Facility: HOSPITAL | Age: 21
Setting detail: OUTPATIENT SURGERY
Discharge: HOME | End: 2021-03-05
Attending: PODIATRIST | Admitting: PODIATRIST
Payer: COMMERCIAL

## 2021-03-05 ENCOUNTER — ANESTHESIA (OUTPATIENT)
Dept: PERIOP | Facility: HOSPITAL | Age: 21
End: 2021-03-05
Payer: COMMERCIAL

## 2021-03-05 ENCOUNTER — APPOINTMENT (OUTPATIENT)
Dept: RADIOLOGY | Facility: HOSPITAL | Age: 21
End: 2021-03-05
Payer: COMMERCIAL

## 2021-03-05 VITALS
RESPIRATION RATE: 16 BRPM | HEART RATE: 72 BPM | BODY MASS INDEX: 19.5 KG/M2 | DIASTOLIC BLOOD PRESSURE: 72 MMHG | WEIGHT: 144 LBS | SYSTOLIC BLOOD PRESSURE: 133 MMHG | HEIGHT: 72 IN | OXYGEN SATURATION: 99 % | TEMPERATURE: 97.2 F

## 2021-03-05 DIAGNOSIS — Z98.890 STATUS POST SURGERY: Primary | ICD-10-CM

## 2021-03-05 PROCEDURE — 28755 FUSION OF BIG TOE JOINT: CPT | Performed by: PODIATRIST

## 2021-03-05 PROCEDURE — 99024 POSTOP FOLLOW-UP VISIT: CPT | Performed by: PODIATRIST

## 2021-03-05 PROCEDURE — 73630 X-RAY EXAM OF FOOT: CPT

## 2021-03-05 PROCEDURE — C1713 ANCHOR/SCREW BN/BN,TIS/BN: HCPCS | Performed by: PODIATRIST

## 2021-03-05 PROCEDURE — C1769 GUIDE WIRE: HCPCS | Performed by: PODIATRIST

## 2021-03-05 PROCEDURE — 76000 FLUOROSCOPY <1 HR PHYS/QHP: CPT | Performed by: PODIATRIST

## 2021-03-05 DEVICE — SCREW COMP 4 X 40MM STD FT: Type: IMPLANTABLE DEVICE | Site: FOOT | Status: FUNCTIONAL

## 2021-03-05 RX ORDER — SODIUM CHLORIDE, SODIUM LACTATE, POTASSIUM CHLORIDE, CALCIUM CHLORIDE 600; 310; 30; 20 MG/100ML; MG/100ML; MG/100ML; MG/100ML
75 INJECTION, SOLUTION INTRAVENOUS CONTINUOUS
Status: DISCONTINUED | OUTPATIENT
Start: 2021-03-05 | End: 2021-03-05 | Stop reason: HOSPADM

## 2021-03-05 RX ORDER — FENTANYL CITRATE 50 UG/ML
INJECTION, SOLUTION INTRAMUSCULAR; INTRAVENOUS AS NEEDED
Status: DISCONTINUED | OUTPATIENT
Start: 2021-03-05 | End: 2021-03-05

## 2021-03-05 RX ORDER — DEXAMETHASONE SODIUM PHOSPHATE 10 MG/ML
INJECTION, SOLUTION INTRAMUSCULAR; INTRAVENOUS AS NEEDED
Status: DISCONTINUED | OUTPATIENT
Start: 2021-03-05 | End: 2021-03-05

## 2021-03-05 RX ORDER — MIDAZOLAM HYDROCHLORIDE 2 MG/2ML
INJECTION, SOLUTION INTRAMUSCULAR; INTRAVENOUS AS NEEDED
Status: DISCONTINUED | OUTPATIENT
Start: 2021-03-05 | End: 2021-03-05

## 2021-03-05 RX ORDER — MAGNESIUM HYDROXIDE 1200 MG/15ML
LIQUID ORAL AS NEEDED
Status: DISCONTINUED | OUTPATIENT
Start: 2021-03-05 | End: 2021-03-05 | Stop reason: HOSPADM

## 2021-03-05 RX ORDER — PROPOFOL 10 MG/ML
INJECTION, EMULSION INTRAVENOUS AS NEEDED
Status: DISCONTINUED | OUTPATIENT
Start: 2021-03-05 | End: 2021-03-05

## 2021-03-05 RX ORDER — ONDANSETRON 2 MG/ML
INJECTION INTRAMUSCULAR; INTRAVENOUS AS NEEDED
Status: DISCONTINUED | OUTPATIENT
Start: 2021-03-05 | End: 2021-03-05

## 2021-03-05 RX ORDER — OXYCODONE HYDROCHLORIDE AND ACETAMINOPHEN 5; 325 MG/1; MG/1
1 TABLET ORAL EVERY 4 HOURS PRN
Status: DISCONTINUED | OUTPATIENT
Start: 2021-03-05 | End: 2021-03-05 | Stop reason: HOSPADM

## 2021-03-05 RX ORDER — OXYCODONE HYDROCHLORIDE AND ACETAMINOPHEN 5; 325 MG/1; MG/1
1 TABLET ORAL EVERY 4 HOURS PRN
Qty: 15 TABLET | Refills: 0 | Status: SHIPPED | OUTPATIENT
Start: 2021-03-05 | End: 2021-03-15

## 2021-03-05 RX ORDER — DIPHENHYDRAMINE HYDROCHLORIDE 50 MG/ML
12.5 INJECTION INTRAMUSCULAR; INTRAVENOUS ONCE AS NEEDED
Status: DISCONTINUED | OUTPATIENT
Start: 2021-03-05 | End: 2021-03-05 | Stop reason: HOSPADM

## 2021-03-05 RX ORDER — FENTANYL CITRATE/PF 50 MCG/ML
25 SYRINGE (ML) INJECTION
Status: DISCONTINUED | OUTPATIENT
Start: 2021-03-05 | End: 2021-03-05 | Stop reason: HOSPADM

## 2021-03-05 RX ORDER — LIDOCAINE HYDROCHLORIDE 10 MG/ML
INJECTION, SOLUTION EPIDURAL; INFILTRATION; INTRACAUDAL; PERINEURAL AS NEEDED
Status: DISCONTINUED | OUTPATIENT
Start: 2021-03-05 | End: 2021-03-05

## 2021-03-05 RX ORDER — GLYCOPYRROLATE 0.2 MG/ML
INJECTION INTRAMUSCULAR; INTRAVENOUS AS NEEDED
Status: DISCONTINUED | OUTPATIENT
Start: 2021-03-05 | End: 2021-03-05

## 2021-03-05 RX ORDER — CEFAZOLIN SODIUM 1 G/50ML
SOLUTION INTRAVENOUS AS NEEDED
Status: DISCONTINUED | OUTPATIENT
Start: 2021-03-05 | End: 2021-03-05

## 2021-03-05 RX ORDER — CEFAZOLIN SODIUM 1 G/50ML
1000 SOLUTION INTRAVENOUS ONCE
Status: DISCONTINUED | OUTPATIENT
Start: 2021-03-05 | End: 2021-03-05 | Stop reason: HOSPADM

## 2021-03-05 RX ORDER — HYDROMORPHONE HCL/PF 1 MG/ML
0.5 SYRINGE (ML) INJECTION
Status: DISCONTINUED | OUTPATIENT
Start: 2021-03-05 | End: 2021-03-05 | Stop reason: HOSPADM

## 2021-03-05 RX ADMIN — FENTANYL CITRATE 50 MCG: 50 INJECTION INTRAMUSCULAR; INTRAVENOUS at 16:15

## 2021-03-05 RX ADMIN — PROPOFOL 100 MG: 10 INJECTION, EMULSION INTRAVENOUS at 16:20

## 2021-03-05 RX ADMIN — PROPOFOL 100 MG: 10 INJECTION, EMULSION INTRAVENOUS at 16:19

## 2021-03-05 RX ADMIN — ONDANSETRON HYDROCHLORIDE 4 MG: 2 INJECTION, SOLUTION INTRAMUSCULAR; INTRAVENOUS at 16:34

## 2021-03-05 RX ADMIN — CEFAZOLIN SODIUM 1000 MG: 1 SOLUTION INTRAVENOUS at 16:10

## 2021-03-05 RX ADMIN — PROPOFOL 100 MG: 10 INJECTION, EMULSION INTRAVENOUS at 16:16

## 2021-03-05 RX ADMIN — SODIUM CHLORIDE, SODIUM LACTATE, POTASSIUM CHLORIDE, AND CALCIUM CHLORIDE: .6; .31; .03; .02 INJECTION, SOLUTION INTRAVENOUS at 15:49

## 2021-03-05 RX ADMIN — MIDAZOLAM HYDROCHLORIDE 2 MG: 1 INJECTION, SOLUTION INTRAMUSCULAR; INTRAVENOUS at 16:09

## 2021-03-05 RX ADMIN — FENTANYL CITRATE 50 MCG: 50 INJECTION INTRAMUSCULAR; INTRAVENOUS at 16:20

## 2021-03-05 RX ADMIN — PROPOFOL 200 MG: 10 INJECTION, EMULSION INTRAVENOUS at 16:15

## 2021-03-05 RX ADMIN — DEXAMETHASONE SODIUM PHOSPHATE 4 MG: 10 INJECTION, SOLUTION INTRAMUSCULAR; INTRAVENOUS at 16:19

## 2021-03-05 RX ADMIN — LIDOCAINE HYDROCHLORIDE 50 MG: 10 INJECTION, SOLUTION EPIDURAL; INFILTRATION; INTRACAUDAL; PERINEURAL at 16:15

## 2021-03-05 RX ADMIN — SODIUM CHLORIDE, SODIUM LACTATE, POTASSIUM CHLORIDE, AND CALCIUM CHLORIDE: .6; .31; .03; .02 INJECTION, SOLUTION INTRAVENOUS at 15:55

## 2021-03-05 RX ADMIN — GLYCOPYRROLATE 0.2 MG: 0.2 INJECTION, SOLUTION INTRAMUSCULAR; INTRAVENOUS at 16:09

## 2021-03-05 RX ADMIN — PROPOFOL 100 MG: 10 INJECTION, EMULSION INTRAVENOUS at 16:17

## 2021-03-05 NOTE — DISCHARGE SUMMARY
Discharge Summary Outpatient Procedure Podiatry -   Cruzito Moss 23 y o  male MRN: [id number]  Unit/Bed#: OR POOL Encounter: [id number]    Admission Date: 3/5/2021     Admitting Diagnosis: Hallux abducto valgus, left [M20 12]  Pain in toe of left foot [M79 675]    Discharge Diagnosis: same    Procedures Performed: Juanjose Lantigua / Wolf Carlisle left great toe interphalangeal joint: 86637 (CPT®)    Complications: none    Condition at Discharge: stable    Discharge instructions/Information to patient and family:   See after visit summary for information provided to patient and family  Provisions for Follow-Up Care/Important appointments:  See after visit summary for information related to follow-up care and any pertinent home health orders  Discharge Medications:  See after visit summary for reconciled discharge medications provided to patient and family

## 2021-03-05 NOTE — INTERVAL H&P NOTE
H&P reviewed  After examining the patient I find no changes in the patients condition since the H&P had been written      Vitals:    03/05/21 1348   BP: 138/83   Pulse: 79   Resp: 20   Temp: (!) 97 °F (36 1 °C)   SpO2: 99%

## 2021-03-05 NOTE — ANESTHESIA PREPROCEDURE EVALUATION
Procedure:  ARTHRODESIS / Demetrius Fix left great toe interphalangeal joint (Left Foot)    Relevant Problems   PULMONARY   (-) Asthma   (-) Smoking   (-) URI (upper respiratory infection)        Physical Exam    Airway    Mallampati score: I  TM Distance: >3 FB  Neck ROM: full     Dental   No notable dental hx     Cardiovascular  Cardiovascular exam normal    Pulmonary  Pulmonary exam normal     Other Findings        Anesthesia Plan  ASA Score- 1     Anesthesia Type- general with ASA Monitors  Additional Monitors:   Airway Plan: LMA  Plan Factors-    Chart reviewed  Induction- intravenous  Postoperative Plan-     Informed Consent- Anesthetic plan and risks discussed with patient  I personally reviewed this patient with the CRNA  Discussed and agreed on the Anesthesia Plan with the CRNA  Alison Aviles

## 2021-03-05 NOTE — ANESTHESIA POSTPROCEDURE EVALUATION
Post-Op Assessment Note    CV Status:  Stable  Pain Score: 0    Pain management: adequate     Mental Status:  Alert and awake   Hydration Status:  Euvolemic   PONV Controlled:  Controlled   Airway Patency:  Patent      Post Op Vitals Reviewed: Yes      Staff: Anesthesiologist         No complications documented      BP (P) 118/56 (03/05/21 1734)    Temp (P) 97 6 °F (36 4 °C) (03/05/21 1734)    Pulse (P) 71 (03/05/21 1734)   Resp (P) 16 (03/05/21 1734)    SpO2 (P) 98 % (03/05/21 1734)

## 2021-03-05 NOTE — DISCHARGE INSTRUCTIONS
Dr Tyler Dickson Instructions    1  Take your prescribed medication as directed  2  Upon arrival at home, lie down and elevate your surgical foot on 2 pillows  3  Remain quiet, off your feet as much as possible, for the first 24-48 hours  This is when your feet first swell and may become painful  After 48 hours you may begin limited walking following these restrictions:   Partial weightbearing to heel of surgical foot use surgical shoe and crutches   4  Drink large quantities of water  Consume no alcohol  Continue a well-balanced diet  5  Report any unusual discomfort or fever to this office  6  A limited amount of discomfort and swelling is to be expected  In some cases the skin may take on a bruised appearance  The surgical solution that was applied to your foot prior to the operation is dark in color and the operation site may appear to be oozing when it actually is not  7  A slight amount of blood is to be expected, and is no cause for alarm  Do not remove the dressings  If there is active bleeding and if the bleeding persists, add additional gauze to the bandage, apply direct pressure, elevate your feet and call this office  8  Do not get the dressings wet  As regular bathing may be inconvenient, sponge baths are recommended  9  When anesthesia wears off and if any discomfort should be present, apply an ice pack directly over the operated area for 15 minute intervals for several hours or until the pain leaves  (USE IN EXCESS OF 15 MINUTES COULD CAUSE FROSTBITE)  Do not use hot water bags or electric pads  A convenient icepack can be made by placing ice cubes in a plastic bag and covering this with a towel  10  If necessary, take a mild laxative before retiring  11  Wear your special open shoes anytime you put weight on your foot, even if it is just to walk to the bathroom and back  It will probably be 2 or 3 weeks before you will be permitted to try regular shoes    12  Having performed the operation, we are interested in a prompt recovery  Please cooperate by following the above instructions  13  Please call to confirm your post-op appointment or call with any other questions

## 2021-03-05 NOTE — OP NOTE
OPERATIVE REPORT - Podiatry  PATIENT NAME: Ruth Rosen    :  2000  MRN: [id number]  Pt Location:  OR ROOM 12    SURGERY DATE: 3/5/2021    Surgeon(s) and Role:     * Kanwal Muro DPM - Primary     * Nguyen Green DPM - Assisting    Pre-op Diagnosis:  Hallux abducto valgus, left [M20 12]  Pain in toe of left foot [M79 675]    Post-Op Diagnosis Codes:     * Hallux abducto valgus, left [M20 12]     * Pain in toe of left foot [M79 675]    Procedure(s) (LRB):  ARTHRODESIS / FUSION left great toe interphalangeal joint (Left)    Specimen(s):  * No specimens in log *    Estimated Blood Loss:   Minimal    Drains:  * No LDAs found *    Anesthesia Type:   General/LMA with 15 ml of 1% Lidocaine and 0 5% Bupivacaine in a 1:1 mixture    Hemostasis:  Left pneumatic ankle tourniquet at 250 mmHg for less than 60 minutes    Materials:  Implant Name Type Inv  Item Serial No   Lot No  LRB No  Used Action   SCREW Screw     Left 1 Implanted     3-0 Vicryl, 4-0 Monocryl, 4-0 nylon   Arthrex 4 0x44 mm cannulated headless compression screw  Operative Findings:  Consistent with diagnosis  Complications:   None    Procedure and Technique:     Under mild sedation, the patient was brought into the operating room and placed on the operating room table in the supine position  IV sedation was achieved by anesthesia team and a universal timeout was performed where all parties are in agreement of correct patient, correct procedure and correct site  A pneumatic tourniquet was then placed over the patient's left lower extremity with ample padding  A digital hallux block was performed consisting of 15 ml of 1% Lidocaine and 0 5% Bupivacaine in a 1:1 mixture  The foot was then prepped and draped in the usual aseptic manner  An esmarch bandage was used to exsangunate the foot and the pneumatic tourniquet was then inflated to 250mmHg      Attention was directed to the left hallux where non reducible hallux deformity was located  Using 15  Blade approximately 3 cm linear incision was made at the level of interphalangeal joint  Incision was carried down to the level of subcutaneous tissue using sharp and blunt dissection  Care was taken to identify and retract all the vital neurovascular structures  All the bleeders were cauterized as necessary  Incision was then carried down to the level of capsule, capsule and periosteal structures were reflected off of the osseous attachment thus exposing the IPJ  Next utilizing a sagittal saw the head of the proximal phalanx and base of the distal phalanx was resected  The incision was then rinsed with copious amount of normal sterile saline  A a K-wire was inserted in a retrograde fashion to align end-to-end of the 2 bones  Utilizing a C-arm the position of the K-wire was verified with multiple views and appeared to be appropriate  Next small stab incision was made at the tip of the distal phalanx and using an AO technique a 4-0 drill bit was utilized to drill the hallux  An Arthrex 4 0x40mm headless cannulated compression crossed screw was inserted to fixate the fusion site  Appropriate correction with good compression was achieved  Final C-arm fluoroscopy imaging was obtained in multiple views to confirm the hardware and digit position which was adequate  Incision was rinsed with copious amount of normal sterile saline  Periosteal and capsular structures were reapproximated utilizing a 3-0 Vicryl  The subcutaneous tissue was then reapproximated utilizing a 4-0 Monocryl  The skin edges were then reapproximated utilizing a 4-0 nylon in horizontal mattress fashion  The incision was then dressed with Acticoat, 4 x 4 gauze, Yudy and an Ace wrap  The tourniquet was deflated at approximately and normal hyperemic response was noted to all digits   The patient tolerated the procedure and anesthesia well without immediate complications and transferred to PACU with vital signs stable  Dr Ameena Walden was present during the entire procedure and participated in all arshad aspects  Robles Whelan DPM  DATE: March 5, 2021  TIME: 5:39 PM      Portions of the record may have been created with voice recognition software  Occasional wrong word or "sound a like" substitutions may have occurred due to the inherent limitations of voice recognition software  Read the chart carefully and recognize, using context, where substitutions have occurred

## 2021-03-10 ENCOUNTER — OFFICE VISIT (OUTPATIENT)
Dept: PODIATRY | Facility: CLINIC | Age: 21
End: 2021-03-10

## 2021-03-10 VITALS
HEIGHT: 72 IN | WEIGHT: 145 LBS | BODY MASS INDEX: 19.64 KG/M2 | HEART RATE: 70 BPM | SYSTOLIC BLOOD PRESSURE: 120 MMHG | DIASTOLIC BLOOD PRESSURE: 78 MMHG

## 2021-03-10 DIAGNOSIS — M79.675 PAIN IN TOE OF LEFT FOOT: ICD-10-CM

## 2021-03-10 DIAGNOSIS — M20.32 ACQUIRED HALLUX MALLEUS OF LEFT FOOT: Primary | ICD-10-CM

## 2021-03-10 PROCEDURE — 99024 POSTOP FOLLOW-UP VISIT: CPT | Performed by: PODIATRIST

## 2021-03-10 NOTE — PROGRESS NOTES
Assessment/Plan:      Diagnoses and all orders for this visit:    Acquired hallux malleus of left foot    Pain in toe of left foot      Patient is doing well 1 week status post fusion left interphalangeal joint with correction of deformity  No sign of infection  Sutures intact  Plan for suture removal 1 week  He may weight bear in surgical shoe but should rest as much as possible  So far he states he is pleased with the procedure and the appearance of his toe  Subjective:     Patient ID: Shelbie Singletary is a 23 y o  male  Patient had left great toe interphalangeal joint fusion on March 5, 2021  Pain was significant over the weekend but much better  His dressing is intact  Overall he is doing well  Review of Systems      Objective:     Physical Exam      Left foot exam shows neurovascular status intact  Capillary refill to the left great toe is brisk  Sutures in place to the tip of the toe as well as the dorsal interphalangeal joint  The IP joint has been fused  Normal range of motion of the metatarsophalangeal joint  Extensor tendon intact  No calf pain  Normal ankle range of motion

## 2021-03-17 ENCOUNTER — OFFICE VISIT (OUTPATIENT)
Dept: PODIATRY | Facility: CLINIC | Age: 21
End: 2021-03-17

## 2021-03-17 VITALS
DIASTOLIC BLOOD PRESSURE: 77 MMHG | HEIGHT: 72 IN | HEART RATE: 73 BPM | SYSTOLIC BLOOD PRESSURE: 117 MMHG | BODY MASS INDEX: 19.88 KG/M2 | WEIGHT: 146.8 LBS

## 2021-03-17 DIAGNOSIS — M79.675 PAIN IN TOE OF LEFT FOOT: ICD-10-CM

## 2021-03-17 DIAGNOSIS — M20.32 ACQUIRED HALLUX MALLEUS OF LEFT FOOT: Primary | ICD-10-CM

## 2021-03-17 PROCEDURE — 99024 POSTOP FOLLOW-UP VISIT: CPT | Performed by: PODIATRIST

## 2021-03-17 NOTE — PROGRESS NOTES
Assessment/Plan:      Diagnoses and all orders for this visit:    Acquired hallux malleus of left foot    Pain in toe of left foot        Incision healed, sutures removed  Patient has minimal pain  He should remain in his surgical shoe for 2-3 more weeks  I will take a serial x-ray at that time he can likely transition into a sneaker  Patient is pleased with his recovery so far  Subjective:     Patient ID: Kallie Schmidt is a 23 y o  male  Patient is 2 weeks status post fusion of his left 1st interphalangeal joint  He reports minimal pain  He is in the surgical shoe  He denies drainage from the incision  Overall he feels well  Review of Systems      Objective:     Physical Exam      Left foot exam shows pedal pulses intact  Incision over the great toe interphalangeal joint is healed  There is no range of motion of the IPJ but the metatarsophalangeal joint is normal   Minimal pain on palpation    Extensor and flexor tendons are intact and functioning normally the great toe

## 2021-03-29 ENCOUNTER — OFFICE VISIT (OUTPATIENT)
Dept: PODIATRY | Facility: CLINIC | Age: 21
End: 2021-03-29

## 2021-03-29 VITALS
SYSTOLIC BLOOD PRESSURE: 129 MMHG | HEART RATE: 67 BPM | DIASTOLIC BLOOD PRESSURE: 81 MMHG | BODY MASS INDEX: 20.45 KG/M2 | HEIGHT: 72 IN | WEIGHT: 151 LBS

## 2021-03-29 DIAGNOSIS — M20.32 ACQUIRED HALLUX MALLEUS OF LEFT FOOT: Primary | ICD-10-CM

## 2021-03-29 PROCEDURE — 99024 POSTOP FOLLOW-UP VISIT: CPT | Performed by: PODIATRIST

## 2021-03-29 NOTE — PROGRESS NOTES
Assessment/Plan:      Diagnoses and all orders for this visit:    Acquired hallux malleus of left foot  -     Cancel: X-ray toe left 2+ views; Future      Patient is 3 weeks  Status post fusion left 1st inner phalangeal joint of the great toe  Given the minimal amount of pain and normal findings on x-ray I think he may slowly begin to increase activity  I would not recommend he go to full duty for at least 2-3 more weeks  Patient is willing to stay out of work 1 more week, total of 1 month postop  At that time he may return to work to light duty for 2 weeks  Restrictions given and a note for his employer  I would like to check progress in 1 month  Subjective:     Patient ID: Shelbie Singletary is a 23 y o  male  Patient is 3 weeks s/p left great toe IPJ fusion  He reports no pain  Reports no pain and has stopped wearing his surgical shoe  He would like to go back to work  Was supposed to been in a surgical shoe for at least 1 month but since the was no pain he felt it was okay to be in a sneaker  The incision is mildly inflamed but no sign of any significant complication  The toe correction is still stable      Review of Systems   Constitutional: Negative  Objective:     Physical Exam  Vitals signs reviewed  Constitutional:       Appearance: He is normal weight  Cardiovascular:      Pulses: Normal pulses  Musculoskeletal:      Comments: Left great toe interphalangeal joint is fused with stable correction of deformity  Mild edema  No metatarsophalangeal joint pain or instability  Extensor hallucis function is intact with normal strength       XRay 2 views of the  Left great toe personally read by Dr Ramiro Gee in office today and discussed with patient:  1   stable hardware  2  Stable correction of the deformity   3  There is interval fusion at the interphalangeal joint but there is still  Some joint space noticeable  Expected interval changes noted    No lucency or signs of infection around the hardware

## 2021-03-29 NOTE — LETTER
March 29, 2021     Patient: Gregory Leonard   YOB: 2000   Date of Visit: 3/29/2021       To Whom it May Concern:    Gregory Leonard is under my professional care  He was seen in my office on 3/29/2021  He may return to work with limitations on April 5, 2021   1  Please allow light duty for 2 weeks  2  No ladders for 2 weeks  3  No lifting over 20 pounds for 2 weeks  4  NO jumping for 2 weeks  5  May return to full duty on 5/19/2021    If you have any questions or concerns, please don't hesitate to call           Sincerely,          Chalino Monet DPM        CC: No Recipients

## 2022-02-09 ENCOUNTER — OFFICE VISIT (OUTPATIENT)
Dept: PODIATRY | Facility: CLINIC | Age: 22
End: 2022-02-09
Payer: COMMERCIAL

## 2022-02-09 VITALS
DIASTOLIC BLOOD PRESSURE: 77 MMHG | SYSTOLIC BLOOD PRESSURE: 117 MMHG | HEART RATE: 70 BPM | HEIGHT: 72 IN | WEIGHT: 150.6 LBS | BODY MASS INDEX: 20.4 KG/M2

## 2022-02-09 DIAGNOSIS — M79.674 CHRONIC PAIN OF TOE, RIGHT: ICD-10-CM

## 2022-02-09 DIAGNOSIS — M20.31 HALLUX MALLEUS OF RIGHT FOOT: Primary | ICD-10-CM

## 2022-02-09 DIAGNOSIS — G89.29 CHRONIC PAIN OF TOE, RIGHT: ICD-10-CM

## 2022-02-09 PROCEDURE — 99214 OFFICE O/P EST MOD 30 MIN: CPT | Performed by: PODIATRIST

## 2022-02-09 NOTE — PROGRESS NOTES
Assessment/Plan:         Diagnoses and all orders for this visit:    Hallux malleus of right foot  -     Case request operating room: Fusion IPJ right great toe; Standing  -     Case request operating room: Fusion IPJ right great toe    Chronic pain of toe, right  -     Case request operating room: Fusion IPJ right great toe; Standing  -     Case request operating room: Fusion IPJ right great toe        Diagnosis and options discussed with patient  Patient agreeable to the plan as stated below   patient had similar deformity on his left foot corrected last year without complication  We will plan same presurgery on his right great toe which involves fusion and angular correction of the 1st interphalangeal joint  I was very clear at the beginning of the discussion about alternatives to this surgery including benign neglect, bracing, and second surgical opinions  I spent time to discuss with the patient the surgical procedure(s) as  Alternatives, risks, complications  I discussed risks as infection, scar, swelling, chronic pain, painful or prominent hardware, possible need to remove hardware, poor healing of incision or bone that could require more surgery, incomplete correction of deformities or recurrence of deformities, change in shape of foot, toe, or walking and function, numbness, neuritis/RSD, blood clots in the leg or lung, and even death from anesthesia complications  No guarantees were given and the possibility of recurrent deformity or incomplete correction were discussed before patient signed the consent form  We also discussed the need for possible anticoagulation  The offloading device necessary after the surgery will be CAM boot which he has  The surgery, history and physical and PATS will be scheduled  Subjective:      Patient ID: Madina Kellogg is a 19 y o  male  Patient presents for surgical  Consult    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 at the interphalangeal joint  Last year patient underwent angular correction fusion of the interphalangeal joint of his left great toe  That resolved without any significant complication  He would like to discuss having his right great toe corrected as well  He reports no changes in health  The toe causes pain from rubbing against the 2nd digit  The following portions of the patient's history were reviewed and updated as appropriate:   He  has a past medical history of Hallux abducto valgus, left  He   Patient Active Problem List    Diagnosis Date Noted    Hallux abducto valgus, left 02/15/2021    Pre-op evaluation 02/15/2021    Iliotibial band syndrome of left side 02/21/2020     He  has a past surgical history that includes [place] tooth extraction and pr fusion big toe,i-p joint (Left, 3/5/2021)  His family history includes Arthritis in his mother; Cancer in his father; Hypertension in his father  He  reports that he has never smoked  He has never used smokeless tobacco  He reports current alcohol use of about 3 0 standard drinks of alcohol per week  He reports previous drug use  Drug: Marijuana  Current Outpatient Medications   Medication Sig Dispense Refill    Multiple Vitamin (multivitamin) tablet Take 1 tablet by mouth daily       No current facility-administered medications for this visit  Current Outpatient Medications on File Prior to Visit   Medication Sig    Multiple Vitamin (multivitamin) tablet Take 1 tablet by mouth daily     No current facility-administered medications on file prior to visit  He has No Known Allergies       Review of Systems   Constitutional: Negative  HENT: Negative  Respiratory: Negative  Cardiovascular: Negative  Gastrointestinal: Negative  Musculoskeletal: Positive for arthralgias and joint swelling  Negative for gait problem  Skin: Negative for color change and wound  Neurological: Negative for weakness and numbness  Objective:      /77   Pulse 70   Ht 6' (1 829 m) Comment: verbal  Wt 68 3 kg (150 lb 9 6 oz)   BMI 20 43 kg/m²          Physical Exam  Vitals reviewed  Constitutional:       General: He is not in acute distress  Appearance: He is normal weight  He is not toxic-appearing  HENT:      Nose: No congestion or rhinorrhea  Cardiovascular:      Rate and Rhythm: Normal rate  Pulses: Normal pulses  Dorsalis pedis pulses are 2+ on the right side and 2+ on the left side  Posterior tibial pulses are 2+ on the right side and 2+ on the left side  Pulmonary:      Effort: Pulmonary effort is normal  No respiratory distress  Abdominal:      General: There is no distension  Tenderness: There is no abdominal tenderness  Musculoskeletal:      Right foot: Deformity ( significant abduction deformity of the interphalangeal joint great toe) present  Left foot: Deformity (  Toe is straight however the interphalangeal joint of the hallux is fused) present  Feet:      Right foot:      Skin integrity: Skin integrity normal       Toenail Condition: Right toenails are normal       Left foot:      Skin integrity: Skin integrity normal       Toenail Condition: Left toenails are normal    Skin:     Capillary Refill: Capillary refill takes less than 2 seconds  Findings: No bruising, erythema, lesion or rash  Neurological:      Mental Status: He is alert and oriented to person, place, and time  Cranial Nerves: No cranial nerve deficit  Motor: No weakness        Gait: Gait normal    Psychiatric:         Mood and Affect: Mood normal

## 2022-03-22 ENCOUNTER — TELEPHONE (OUTPATIENT)
Dept: PODIATRY | Facility: CLINIC | Age: 22
End: 2022-03-22

## 2022-03-22 NOTE — TELEPHONE ENCOUNTER
Call placed to patient and LM in regards to upcoming surgery  With new OR blocks, there is now availability sooner  Will await call back

## 2022-03-30 ENCOUNTER — APPOINTMENT (OUTPATIENT)
Dept: LAB | Age: 22
End: 2022-03-30
Payer: COMMERCIAL

## 2022-03-30 DIAGNOSIS — M79.674 CHRONIC PAIN OF TOE, RIGHT: ICD-10-CM

## 2022-03-30 DIAGNOSIS — G89.29 CHRONIC PAIN OF TOE, RIGHT: ICD-10-CM

## 2022-03-30 DIAGNOSIS — M20.31 HALLUX MALLEUS OF RIGHT FOOT: ICD-10-CM

## 2022-03-30 LAB
ANION GAP SERPL CALCULATED.3IONS-SCNC: 6 MMOL/L (ref 4–13)
BASOPHILS # BLD AUTO: 0.03 THOUSANDS/ΜL (ref 0–0.1)
BASOPHILS NFR BLD AUTO: 1 % (ref 0–1)
BUN SERPL-MCNC: 13 MG/DL (ref 5–25)
CALCIUM SERPL-MCNC: 9.6 MG/DL (ref 8.3–10.1)
CHLORIDE SERPL-SCNC: 107 MMOL/L (ref 100–108)
CO2 SERPL-SCNC: 27 MMOL/L (ref 21–32)
CREAT SERPL-MCNC: 0.88 MG/DL (ref 0.6–1.3)
EOSINOPHIL # BLD AUTO: 0.06 THOUSAND/ΜL (ref 0–0.61)
EOSINOPHIL NFR BLD AUTO: 1 % (ref 0–6)
ERYTHROCYTE [DISTWIDTH] IN BLOOD BY AUTOMATED COUNT: 12.1 % (ref 11.6–15.1)
GFR SERPL CREATININE-BSD FRML MDRD: 121 ML/MIN/1.73SQ M
GLUCOSE SERPL-MCNC: 97 MG/DL (ref 65–140)
HCT VFR BLD AUTO: 41.9 % (ref 36.5–49.3)
HGB BLD-MCNC: 14.4 G/DL (ref 12–17)
IMM GRANULOCYTES # BLD AUTO: 0.01 THOUSAND/UL (ref 0–0.2)
IMM GRANULOCYTES NFR BLD AUTO: 0 % (ref 0–2)
LYMPHOCYTES # BLD AUTO: 1.71 THOUSANDS/ΜL (ref 0.6–4.47)
LYMPHOCYTES NFR BLD AUTO: 38 % (ref 14–44)
MCH RBC QN AUTO: 30.9 PG (ref 26.8–34.3)
MCHC RBC AUTO-ENTMCNC: 34.4 G/DL (ref 31.4–37.4)
MCV RBC AUTO: 90 FL (ref 82–98)
MONOCYTES # BLD AUTO: 0.59 THOUSAND/ΜL (ref 0.17–1.22)
MONOCYTES NFR BLD AUTO: 13 % (ref 4–12)
NEUTROPHILS # BLD AUTO: 2.15 THOUSANDS/ΜL (ref 1.85–7.62)
NEUTS SEG NFR BLD AUTO: 47 % (ref 43–75)
NRBC BLD AUTO-RTO: 0 /100 WBCS
PLATELET # BLD AUTO: 223 THOUSANDS/UL (ref 149–390)
PMV BLD AUTO: 10.3 FL (ref 8.9–12.7)
POTASSIUM SERPL-SCNC: 4.2 MMOL/L (ref 3.5–5.3)
RBC # BLD AUTO: 4.66 MILLION/UL (ref 3.88–5.62)
SODIUM SERPL-SCNC: 140 MMOL/L (ref 136–145)
WBC # BLD AUTO: 4.55 THOUSAND/UL (ref 4.31–10.16)

## 2022-03-30 PROCEDURE — 85025 COMPLETE CBC W/AUTO DIFF WBC: CPT

## 2022-03-30 PROCEDURE — 80048 BASIC METABOLIC PNL TOTAL CA: CPT

## 2022-03-30 PROCEDURE — 36415 COLL VENOUS BLD VENIPUNCTURE: CPT

## 2022-04-14 ENCOUNTER — CONSULT (OUTPATIENT)
Dept: INTERNAL MEDICINE CLINIC | Facility: CLINIC | Age: 22
End: 2022-04-14
Payer: COMMERCIAL

## 2022-04-14 VITALS
TEMPERATURE: 98 F | HEART RATE: 78 BPM | BODY MASS INDEX: 19.69 KG/M2 | DIASTOLIC BLOOD PRESSURE: 70 MMHG | HEIGHT: 72 IN | SYSTOLIC BLOOD PRESSURE: 130 MMHG | WEIGHT: 145.4 LBS | OXYGEN SATURATION: 98 %

## 2022-04-14 DIAGNOSIS — Z01.818 PRE-OP EVALUATION: ICD-10-CM

## 2022-04-14 DIAGNOSIS — M20.11 HALLUX ABDUCTO VALGUS, RIGHT: Primary | ICD-10-CM

## 2022-04-14 PROCEDURE — 99242 OFF/OP CONSLTJ NEW/EST SF 20: CPT | Performed by: NURSE PRACTITIONER

## 2022-04-14 NOTE — H&P (VIEW-ONLY)
Assessment/Plan:    Hallux abducto valgus, right  Surgery scheduled for 04/29/2022    Pre-op evaluation  Patient cleared for surgery  Will have staff fax consult  Diagnoses and all orders for this visit:    Hallux abducto valgus, right    Pre-op evaluation          Subjective:    Diandra Crump is a 19y o  year old male who presents to the office today for a preoperative consultation at the request of surgeon Dr Amanda Bro who plans on performing surgery on Hallux abducto valgus of right foot on April 29,2022 This consultation is requested for the specific conditions prompting preoperative evaluation (i e  because of potential affect on operative risk)  Planned anesthesia: general  The patient has the following known anesthesia issues: denies  Patients bleeding risk: no recent abnormal bleeding  Patient does not have objections to receiving blood products if needed  Patient is able to walk 4 blocks without symptoms  Patient is able to walk up 2 flights of stairs without symptoms  Significant past medical history includes:  Patient denies any PMH  Tobacco use: denies  Alcohol use: socially  Illicit drug use: denies    Symptoms:   Easy bleeding: no  Easy bruising: no  Frequent nose bleeds: no  Chest pain: no  Cough: no  Dyspnea on exertion:no  Edema: no  Palpitations: no  Wheezing: no    Living situation: Patient lives with his girl friend in a one story home  His girlfriend will be caring for him after surgery  Hedoes not have post-op concerns  The following portions of the patient's history were reviewed and updated as appropriate: allergies, current medications, past family history, past medical history, past social history, past surgical history and problem list     Review of Systems  Review of Systems   Constitutional: Negative for activity change, appetite change, chills, diaphoresis and fever     HENT: Negative for congestion, ear discharge, ear pain, postnasal drip, rhinorrhea, sinus pressure, sinus pain and sore throat  Eyes: Negative for pain, discharge, itching and visual disturbance  Respiratory: Negative for cough, chest tightness, shortness of breath and wheezing  Cardiovascular: Negative for chest pain, palpitations and leg swelling  Gastrointestinal: Negative for abdominal pain, constipation, diarrhea, nausea and vomiting  Endocrine: Negative for polydipsia, polyphagia and polyuria  Genitourinary: Negative for difficulty urinating, dysuria and urgency  Musculoskeletal: Negative for arthralgias, back pain and neck pain  Skin: Negative for rash and wound  Neurological: Negative for dizziness, weakness, numbness and headaches  Past Medical History:   Diagnosis Date    Hallux abducto valgus, left      Past Surgical History:   Procedure Laterality Date    [place] FUSION BIG TOE,I-P JOINT Left 3/5/2021    Procedure: ARTHRODESIS / FUSION left great toe interphalangeal joint;  Surgeon: Chalino Monet DPM;  Location: [facility] MAIN OR;  Service: Podiatry    WISDOM TOOTH EXTRACTION       Social History     Tobacco Use    Smoking status: Never Smoker    Smokeless tobacco: Never Used   Vaping Use    Vaping Use: Never used   Substance Use Topics    Alcohol use: Yes     Alcohol/week: 3 0 standard drinks     Types: 3 Glasses of wine per week     Comment: weekly    Drug use: Not Currently     Types: Marijuana     Comment: last smoked in the past month     Family History   Problem Relation Age of Onset    Arthritis Mother     Hypertension Father     Cancer Father      Patient has no known allergies  Current Outpatient Medications   Medication Sig Dispense Refill    Multiple Vitamin (multivitamin) tablet Take 1 tablet by mouth daily (Patient not taking: Reported on 4/14/2022 )       No current facility-administered medications for this visit         Objective:       /70 (BP Location: Left arm, Patient Position: Sitting, Cuff Size: Standard)   Pulse 78   Temp 98 °F (36 7 °C) (Tympanic)   Ht 6' 0 21" (1 834 m)   Wt 66 kg (145 lb 6 4 oz)   SpO2 98%   BMI 19 61 kg/m²   Physical Exam  Constitutional:       General: He is not in acute distress  Appearance: He is well-developed  He is not diaphoretic  HENT:      Head: Normocephalic and atraumatic  Right Ear: External ear normal       Left Ear: External ear normal       Nose: Nose normal       Mouth/Throat:      Pharynx: No oropharyngeal exudate  Eyes:      General:         Right eye: No discharge  Left eye: No discharge  Conjunctiva/sclera: Conjunctivae normal       Pupils: Pupils are equal, round, and reactive to light  Neck:      Thyroid: No thyromegaly  Cardiovascular:      Rate and Rhythm: Normal rate and regular rhythm  Heart sounds: Normal heart sounds  No murmur heard  No friction rub  No gallop  Pulmonary:      Effort: Pulmonary effort is normal  No respiratory distress  Breath sounds: Normal breath sounds  No stridor  No wheezing or rales  Abdominal:      General: Bowel sounds are normal  There is no distension  Palpations: Abdomen is soft  Tenderness: There is no abdominal tenderness  Musculoskeletal:      Cervical back: Normal range of motion and neck supple  Lymphadenopathy:      Cervical: No cervical adenopathy  Skin:     General: Skin is warm and dry  Findings: No erythema or rash  Neurological:      Mental Status: He is alert and oriented to person, place, and time  Psychiatric:         Behavior: Behavior normal          Thought Content: Thought content normal          Judgment: Judgment normal               Cardiographics  ECG: N/A    Lab Review   not applicable     Assessment:     19 y o  male with planned surgery as above  Known risk factors for perioperative complications: None      Cardiac Risk Estimation:     Armando Paulino is cleared from a cardiovascular standpoint to proceed with surgery    He is not risk from a cardiovascular standpoint at this time without any additional cardiac testing  Reevaluation needed if he should present with symptoms prior to surgery  Plan:  Preoperative workup as follows none  Change in medication regimen before surgery: As per surgeon

## 2022-04-14 NOTE — PROGRESS NOTES
Assessment/Plan:    Hallux abducto valgus, right  Surgery scheduled for 04/29/2022    Pre-op evaluation  Patient cleared for surgery  Will have staff fax consult  Diagnoses and all orders for this visit:    Hallux abducto valgus, right    Pre-op evaluation          Subjective:    Inessa Lyons is a 19y o  year old male who presents to the office today for a preoperative consultation at the request of surgeon Dr Parish Bob who plans on performing surgery on Hallux abducto valgus of right foot on April 29,2022 This consultation is requested for the specific conditions prompting preoperative evaluation (i e  because of potential affect on operative risk)  Planned anesthesia: general  The patient has the following known anesthesia issues: denies  Patients bleeding risk: no recent abnormal bleeding  Patient does not have objections to receiving blood products if needed  Patient is able to walk 4 blocks without symptoms  Patient is able to walk up 2 flights of stairs without symptoms  Significant past medical history includes:  Patient denies any PMH  Tobacco use: denies  Alcohol use: socially  Illicit drug use: denies    Symptoms:   Easy bleeding: no  Easy bruising: no  Frequent nose bleeds: no  Chest pain: no  Cough: no  Dyspnea on exertion:no  Edema: no  Palpitations: no  Wheezing: no    Living situation: Patient lives with his girl friend in a one story home  His girlfriend will be caring for him after surgery  Hedoes not have post-op concerns  The following portions of the patient's history were reviewed and updated as appropriate: allergies, current medications, past family history, past medical history, past social history, past surgical history and problem list     Review of Systems  Review of Systems   Constitutional: Negative for activity change, appetite change, chills, diaphoresis and fever     HENT: Negative for congestion, ear discharge, ear pain, postnasal drip, rhinorrhea, sinus pressure, sinus pain and sore throat  Eyes: Negative for pain, discharge, itching and visual disturbance  Respiratory: Negative for cough, chest tightness, shortness of breath and wheezing  Cardiovascular: Negative for chest pain, palpitations and leg swelling  Gastrointestinal: Negative for abdominal pain, constipation, diarrhea, nausea and vomiting  Endocrine: Negative for polydipsia, polyphagia and polyuria  Genitourinary: Negative for difficulty urinating, dysuria and urgency  Musculoskeletal: Negative for arthralgias, back pain and neck pain  Skin: Negative for rash and wound  Neurological: Negative for dizziness, weakness, numbness and headaches  Past Medical History:   Diagnosis Date    Hallux abducto valgus, left      Past Surgical History:   Procedure Laterality Date    [place] FUSION BIG TOE,I-P JOINT Left 3/5/2021    Procedure: ARTHRODESIS / FUSION left great toe interphalangeal joint;  Surgeon: Tariq Garcia DPM;  Location: [address] [place] [address];  Service: Podiatry    WISDOM TOOTH EXTRACTION       Social History     Tobacco Use    Smoking status: Never Smoker    Smokeless tobacco: Never Used   Vaping Use    Vaping Use: Never used   Substance Use Topics    Alcohol use: Yes     Alcohol/week: 3 0 standard drinks     Types: 3 Glasses of wine per week     Comment: weekly    Drug use: Not Currently     Types: Marijuana     Comment: last smoked in the past month     Family History   Problem Relation Age of Onset    Arthritis Mother     Hypertension Father     Cancer Father      Patient has no known allergies  Current Outpatient Medications   Medication Sig Dispense Refill    Multiple Vitamin (multivitamin) tablet Take 1 tablet by mouth daily (Patient not taking: Reported on 4/14/2022 )       No current facility-administered medications for this visit         Objective:       /70 (BP Location: Left arm, Patient Position: Sitting, Cuff Size: Standard)   Pulse 78   Temp 98 °F (36 7 °C) (Tympanic)   Ht 6' 0 21" (1 834 m)   Wt 66 kg (145 lb 6 4 oz)   SpO2 98%   BMI 19 61 kg/m²   Physical Exam  Constitutional:       General: He is not in acute distress  Appearance: He is well-developed  He is not diaphoretic  HENT:      Head: Normocephalic and atraumatic  Right Ear: External ear normal       Left Ear: External ear normal       Nose: Nose normal       Mouth/Throat:      Pharynx: No oropharyngeal exudate  Eyes:      General:         Right eye: No discharge  Left eye: No discharge  Conjunctiva/sclera: Conjunctivae normal       Pupils: Pupils are equal, round, and reactive to light  Neck:      Thyroid: No thyromegaly  Cardiovascular:      Rate and Rhythm: Normal rate and regular rhythm  Heart sounds: Normal heart sounds  No murmur heard  No friction rub  No gallop  Pulmonary:      Effort: Pulmonary effort is normal  No respiratory distress  Breath sounds: Normal breath sounds  No stridor  No wheezing or rales  Abdominal:      General: Bowel sounds are normal  There is no distension  Palpations: Abdomen is soft  Tenderness: There is no abdominal tenderness  Musculoskeletal:      Cervical back: Normal range of motion and neck supple  Lymphadenopathy:      Cervical: No cervical adenopathy  Skin:     General: Skin is warm and dry  Findings: No erythema or rash  Neurological:      Mental Status: He is alert and oriented to person, place, and time  Psychiatric:         Behavior: Behavior normal          Thought Content: Thought content normal          Judgment: Judgment normal               Cardiographics  ECG: N/A    Lab Review   not applicable     Assessment:     19 y o  male with planned surgery as above  Known risk factors for perioperative complications: None      Cardiac Risk Estimation:     Wilfrid Covington is cleared from a cardiovascular standpoint to proceed with surgery    He is not risk from a cardiovascular standpoint at this time without any additional cardiac testing  Reevaluation needed if he should present with symptoms prior to surgery  Plan:  Preoperative workup as follows none  Change in medication regimen before surgery: As per surgeon

## 2022-04-25 NOTE — PRE-PROCEDURE INSTRUCTIONS
No outpatient medications have been marked as taking for the 4/29/22 encounter [facility] HOSPITAL Encounter)  Spoke with patient -not currently on any medications  Told to stop all vitamins and nsaids 1 week prior to surgery  NPO after midnight  Pt denies covid symptoms has been vaccinated  Pt has and understands shower instructions  No powder or creams applied after showers  No alcohol/smoking 24hrs prior to surgery  Bring photo id and insurance card  No jewelry or contacts  1 vaccinated adult allowed  Pt must have a ride home after surgery   [facility] will call Thursday between 2p-7p pt requested that [place] 25 [place] call his father with preop time and instructions #[phone] as patient will not be able to answer call after 2p

## 2022-04-28 ENCOUNTER — ANESTHESIA EVENT (OUTPATIENT)
Dept: PERIOP | Facility: HOSPITAL | Age: 22
End: 2022-04-28
Payer: COMMERCIAL

## 2022-04-29 ENCOUNTER — HOSPITAL ENCOUNTER (OUTPATIENT)
Dept: RADIOLOGY | Facility: HOSPITAL | Age: 22
Setting detail: OUTPATIENT SURGERY
Discharge: HOME | End: 2022-04-29
Payer: COMMERCIAL

## 2022-04-29 ENCOUNTER — APPOINTMENT (OUTPATIENT)
Dept: RADIOLOGY | Facility: HOSPITAL | Age: 22
End: 2022-04-29
Payer: COMMERCIAL

## 2022-04-29 ENCOUNTER — ANESTHESIA (OUTPATIENT)
Dept: PERIOP | Facility: HOSPITAL | Age: 22
End: 2022-04-29
Payer: COMMERCIAL

## 2022-04-29 ENCOUNTER — HOSPITAL ENCOUNTER (OUTPATIENT)
Facility: HOSPITAL | Age: 22
Setting detail: OUTPATIENT SURGERY
Discharge: HOME | End: 2022-04-29
Attending: PODIATRIST | Admitting: PODIATRIST
Payer: COMMERCIAL

## 2022-04-29 VITALS
WEIGHT: 143.52 LBS | TEMPERATURE: 97.5 F | RESPIRATION RATE: 18 BRPM | DIASTOLIC BLOOD PRESSURE: 62 MMHG | HEIGHT: 72 IN | SYSTOLIC BLOOD PRESSURE: 118 MMHG | OXYGEN SATURATION: 98 % | HEART RATE: 59 BPM | BODY MASS INDEX: 19.44 KG/M2

## 2022-04-29 DIAGNOSIS — G89.18 ACUTE POST-OPERATIVE PAIN: Primary | ICD-10-CM

## 2022-04-29 DIAGNOSIS — M20.31 HALLUX MALLEUS OF RIGHT FOOT: ICD-10-CM

## 2022-04-29 PROBLEM — F12.90 MARIJUANA USE: Status: ACTIVE | Noted: 2022-04-29

## 2022-04-29 PROCEDURE — 28755 FUSION OF BIG TOE JOINT: CPT | Performed by: PODIATRIST

## 2022-04-29 PROCEDURE — 76000 FLUOROSCOPY <1 HR PHYS/QHP: CPT | Performed by: PODIATRIST

## 2022-04-29 PROCEDURE — C1713 ANCHOR/SCREW BN/BN,TIS/BN: HCPCS | Performed by: PODIATRIST

## 2022-04-29 PROCEDURE — C1769 GUIDE WIRE: HCPCS | Performed by: PODIATRIST

## 2022-04-29 PROCEDURE — 73660 X-RAY EXAM OF TOE(S): CPT

## 2022-04-29 PROCEDURE — NC001 PR NO CHARGE: Performed by: PODIATRIST

## 2022-04-29 DEVICE — SCREW COMP 4 X 44MM STD FT: Type: IMPLANTABLE DEVICE | Site: TOE | Status: FUNCTIONAL

## 2022-04-29 RX ORDER — MAGNESIUM HYDROXIDE 1200 MG/15ML
LIQUID ORAL AS NEEDED
Status: DISCONTINUED | OUTPATIENT
Start: 2022-04-29 | End: 2022-04-29 | Stop reason: HOSPADM

## 2022-04-29 RX ORDER — SODIUM CHLORIDE 9 MG/ML
125 INJECTION, SOLUTION INTRAVENOUS CONTINUOUS
Status: DISCONTINUED | OUTPATIENT
Start: 2022-04-29 | End: 2022-04-29 | Stop reason: HOSPADM

## 2022-04-29 RX ORDER — CEFAZOLIN SODIUM 1 G/50ML
1000 SOLUTION INTRAVENOUS ONCE
Status: COMPLETED | OUTPATIENT
Start: 2022-04-29 | End: 2022-04-29

## 2022-04-29 RX ORDER — ACETAMINOPHEN 325 MG/1
650 TABLET ORAL EVERY 4 HOURS PRN
Status: DISCONTINUED | OUTPATIENT
Start: 2022-04-29 | End: 2022-04-29 | Stop reason: HOSPADM

## 2022-04-29 RX ORDER — MIDAZOLAM HYDROCHLORIDE 2 MG/2ML
INJECTION, SOLUTION INTRAMUSCULAR; INTRAVENOUS AS NEEDED
Status: DISCONTINUED | OUTPATIENT
Start: 2022-04-29 | End: 2022-04-29

## 2022-04-29 RX ORDER — OXYCODONE HYDROCHLORIDE AND ACETAMINOPHEN 5; 325 MG/1; MG/1
1 TABLET ORAL EVERY 6 HOURS PRN
Qty: 28 TABLET | Refills: 0 | Status: SHIPPED | OUTPATIENT
Start: 2022-04-29 | End: 2022-05-09

## 2022-04-29 RX ORDER — MEPERIDINE HYDROCHLORIDE 25 MG/ML
12.5 INJECTION INTRAMUSCULAR; INTRAVENOUS; SUBCUTANEOUS ONCE AS NEEDED
Status: DISCONTINUED | OUTPATIENT
Start: 2022-04-29 | End: 2022-04-29 | Stop reason: HOSPADM

## 2022-04-29 RX ORDER — ONDANSETRON 2 MG/ML
4 INJECTION INTRAMUSCULAR; INTRAVENOUS ONCE AS NEEDED
Status: DISCONTINUED | OUTPATIENT
Start: 2022-04-29 | End: 2022-04-29 | Stop reason: HOSPADM

## 2022-04-29 RX ORDER — FENTANYL CITRATE 50 UG/ML
INJECTION, SOLUTION INTRAMUSCULAR; INTRAVENOUS AS NEEDED
Status: DISCONTINUED | OUTPATIENT
Start: 2022-04-29 | End: 2022-04-29

## 2022-04-29 RX ORDER — PROPOFOL 10 MG/ML
INJECTION, EMULSION INTRAVENOUS AS NEEDED
Status: DISCONTINUED | OUTPATIENT
Start: 2022-04-29 | End: 2022-04-29

## 2022-04-29 RX ORDER — FENTANYL CITRATE/PF 50 MCG/ML
50 SYRINGE (ML) INJECTION
Status: DISCONTINUED | OUTPATIENT
Start: 2022-04-29 | End: 2022-04-29 | Stop reason: HOSPADM

## 2022-04-29 RX ORDER — PROMETHAZINE HYDROCHLORIDE 25 MG/ML
6.25 INJECTION, SOLUTION INTRAMUSCULAR; INTRAVENOUS ONCE AS NEEDED
Status: DISCONTINUED | OUTPATIENT
Start: 2022-04-29 | End: 2022-04-29 | Stop reason: HOSPADM

## 2022-04-29 RX ORDER — LIDOCAINE HYDROCHLORIDE 20 MG/ML
INJECTION, SOLUTION EPIDURAL; INFILTRATION; INTRACAUDAL; PERINEURAL AS NEEDED
Status: DISCONTINUED | OUTPATIENT
Start: 2022-04-29 | End: 2022-04-29

## 2022-04-29 RX ORDER — OXYCODONE HYDROCHLORIDE 5 MG/1
5 TABLET ORAL EVERY 4 HOURS PRN
Status: DISCONTINUED | OUTPATIENT
Start: 2022-04-29 | End: 2022-04-29 | Stop reason: HOSPADM

## 2022-04-29 RX ORDER — ONDANSETRON 2 MG/ML
4 INJECTION INTRAMUSCULAR; INTRAVENOUS EVERY 6 HOURS PRN
Status: DISCONTINUED | OUTPATIENT
Start: 2022-04-29 | End: 2022-04-29 | Stop reason: HOSPADM

## 2022-04-29 RX ORDER — OXYCODONE HYDROCHLORIDE AND ACETAMINOPHEN 5; 325 MG/1; MG/1
1 TABLET ORAL EVERY 6 HOURS PRN
Qty: 28 TABLET | Refills: 0 | Status: SHIPPED | OUTPATIENT
Start: 2022-04-29 | End: 2022-04-29 | Stop reason: SDUPTHER

## 2022-04-29 RX ORDER — ONDANSETRON 2 MG/ML
INJECTION INTRAMUSCULAR; INTRAVENOUS AS NEEDED
Status: DISCONTINUED | OUTPATIENT
Start: 2022-04-29 | End: 2022-04-29

## 2022-04-29 RX ORDER — HYDROMORPHONE HCL/PF 1 MG/ML
0.5 SYRINGE (ML) INJECTION
Status: DISCONTINUED | OUTPATIENT
Start: 2022-04-29 | End: 2022-04-29 | Stop reason: HOSPADM

## 2022-04-29 RX ADMIN — PROPOFOL 200 MG: 10 INJECTION, EMULSION INTRAVENOUS at 12:29

## 2022-04-29 RX ADMIN — FENTANYL CITRATE 25 MCG: 50 INJECTION INTRAMUSCULAR; INTRAVENOUS at 12:36

## 2022-04-29 RX ADMIN — FENTANYL CITRATE 25 MCG: 50 INJECTION INTRAMUSCULAR; INTRAVENOUS at 13:04

## 2022-04-29 RX ADMIN — SODIUM CHLORIDE 125 ML/HR: 0.9 INJECTION, SOLUTION INTRAVENOUS at 11:01

## 2022-04-29 RX ADMIN — LIDOCAINE HYDROCHLORIDE 100 MG: 20 INJECTION, SOLUTION EPIDURAL; INFILTRATION; INTRACAUDAL; PERINEURAL at 12:29

## 2022-04-29 RX ADMIN — FENTANYL CITRATE 25 MCG: 50 INJECTION INTRAMUSCULAR; INTRAVENOUS at 13:10

## 2022-04-29 RX ADMIN — FENTANYL CITRATE 25 MCG: 50 INJECTION INTRAMUSCULAR; INTRAVENOUS at 13:09

## 2022-04-29 RX ADMIN — MIDAZOLAM 2 MG: 1 INJECTION INTRAMUSCULAR; INTRAVENOUS at 12:24

## 2022-04-29 RX ADMIN — ONDANSETRON 4 MG: 2 INJECTION INTRAMUSCULAR; INTRAVENOUS at 12:29

## 2022-04-29 RX ADMIN — CEFAZOLIN SODIUM 1000 MG: 1 SOLUTION INTRAVENOUS at 12:21

## 2022-04-29 NOTE — DISCHARGE INSTRUCTIONS
Dr Shawna Campuzano Instructions    1  Take your prescribed medication as directed  2  Upon arrival at home, lie down and elevate your surgical foot on 2 pillows  3  Remain quiet, off your feet as much as possible, for the first 24-48 hours  This is when your feet first swell and may become painful  After 48 hours you may begin limited walking following these restrictions:   Weightbear as tolerated to surgical foot to heel with surgical shoe on at all times  4  Drink large quantities of water and citrus fruit juice  Consume no alcohol  Continue a well-balanced diet  5  Report any unusual discomfort or fever to this office  6  A limited amount of discomfort and swelling is to be expected  In some cases the skin may take on a bruised appearance  The surgical solution that was applied to your foot prior to the operation is dark in color and the operation site may appear to be oozing when it actually is not  7  A slight amount of blood is to be expected, and is no cause for alarm  Do not remove the dressings  If there is active bleeding and if the bleeding persists, add additional gauze to the bandage, apply direct pressure, elevate your feet and call this office  8  Do not get the dressings wet  As regular bathing may be inconvenient, sponge baths are recommended  9  When anesthesia wears off and if any discomfort should be present, apply an ice pack directly over the operated area for 15 minute intervals for several hours or until the pain leaves  (USE IN EXCESS OF 15 MINUTES COULD CAUSE FROSTBITE)  Do not use hot water bags or electric pads  A convenient icepack can be made by placing ice cubes in a plastic bag and covering this with a towel  10  If necessary, take a mild laxative before retiring  11  Wear your special open shoes anytime you put weight on your foot, even if it is just to walk to the bathroom and back   It will probably be 2 or 3 weeks before you will be permitted to try regular shoes  12  Having performed the operation, we are interested in a prompt recovery  Please cooperate by following the above instructions  13  Please call to confirm your post-op appointment or call with any other questions

## 2022-04-29 NOTE — ANESTHESIA PREPROCEDURE EVALUATION
Procedure:  Fusion IPJ great toe (Right Foot)    Relevant Problems   Other   (+) Marijuana use        Physical Exam    Airway    Mallampati score: I  TM Distance: <3 FB  Neck ROM: full     Dental   No notable dental hx     Cardiovascular  Rhythm: regular, Rate: normal, Cardiovascular exam normal    Pulmonary  Pulmonary exam normal     Other Findings        Anesthesia Plan  ASA Score- 2     Anesthesia Type- general with ASA Monitors  Additional Monitors:   Airway Plan: LMA  Plan Factors-Exercise tolerance (METS): >4 METS  Existing labs reviewed  Patient summary reviewed  Patient is not a current smoker  Patient did not smoke on day of surgery  Obstructive sleep apnea risk education given perioperatively  Induction- intravenous  Postoperative Plan-     Informed Consent- Anesthetic plan and risks discussed with patient

## 2022-04-29 NOTE — ANESTHESIA POSTPROCEDURE EVALUATION
Post-Op Assessment Note    CV Status:  Stable    Pain management: adequate     Mental Status:  Alert   PONV Controlled:  None   Airway Patency:  Patent      Post Op Vitals Reviewed: Yes      Staff: Anesthesiologist       Blood pressure 128/75, pulse 60, temperature 97 6 °F (36 4 °C), resp  rate 16, height 6' (1 829 m), weight 65 1 kg (143 lb 8 3 oz), SpO2 97 %  No complications documented      BP      Temp      Pulse     Resp      SpO2

## 2022-04-29 NOTE — OP NOTE
OPERATIVE REPORT - Podiatry  PATIENT NAME: Maryan Brock    :  2000  MRN: [id number]  Pt Location: [facility] OR ROOM 03    SURGERY DATE: 2022    Surgeon(s) and Role:     * Sherry Diallo DPM - Primary     * Matty Osuna DPM - Assisting    Pre-op Diagnosis:  Hallux malleus of right foot [M20 31]  Chronic pain of toe, right [M96 629, G89 29]    Post-Op Diagnosis Codes:     * Hallux malleus of right foot [M20 31]     * Chronic pain of toe, right [M79 674, G89 29]    Procedure(s) (LRB):  Fusion IPJ great toe (Right)    Specimen(s):  * No specimens in log *    Estimated Blood Loss:   Minimal    Drains:  * No LDAs found *    Anesthesia Type:   Choice with 10 ml of 1% Lidocaine and 0 5% Bupivacaine in a 1:1 mixture    Hemostasis:  -18 in pneumatic ankle tourniquet inflated to 250 mmHg for approximately 40 minutes  -electrocautery  -manual compression  -atraumatic technique    Materials:  Implant Name Type Inv  Item Serial No   Lot No  LRB No  Used Action   SCREW COMP 4 X 44MM STD FT - [id number]  SCREW COMP 4 X 44MM STD FT  [employer]  Right 1 Implanted     -3-0 Vicryl  -4-0 nylon    Operative Findings:  Consistent with diagnosis    Complications:   None    Procedure and Technique:     Under mild sedation, the patient was brought into the operating room and placed on the operating room table in the supine position  IV sedation was achieved by anesthesia team and a universal timeout was performed where all parties are in agreement of correct patient, correct procedure and correct site  A pneumatic tourniquet was then placed over the patient's right lower extremity with ample padding  A hallux block was performed consisting of 10 ml of 1% Lidocaine and 0 5% Bupivacaine in a 1:1 mixture  The foot was then prepped and draped in the usual aseptic manner  An esmarch bandage was used to exsangunate the foot and the pneumatic tourniquet was then inflated to 250mmHg      Attention was then directed to the dorsal aspect of the right hallux  A surgical marker was utilized to draw out of proximally 3 cm long linear incision just lateral to the EHL tendon  A surgical blade was then utilized to make a controlled depth incision in accordance with the drawn incision line  Hemostasis was achieved utilizing electrocautery  Dissection was carried deep with a surgical blade to level of the periosteum  Periosteal and capsular structures were carefully freed from both the medial lateral aspects of the hallux IPJ  A sagittal saw was then utilized to remove the articular cartilage from both the head of the proximal phalanx and the base of the distal phalanx  Proper positioning of the joint was assessed under C-arm imaging it was found to be excellent  The incision site was then irrigated with a bulb syringe normal sterile saline  A threaded K-wire was utilized to perform subchondral drilling to both the head of the proximal phalanx and the base of the distal phalanx  A K-wire was then inserted in a proximal distal direction through the distal phalanx and out the tip of the toe  It was then inserted back through the proximal phalanx and into the base of the proximal phalanx  Proper positioning was assessed under C-arm imaging was found to be excellent  With standard AO technique an over drill was performed of the K-wire  A 4 0 mm cannulated screw (As described above in implants) was then inserted over the K-wire, across the hallux IPJ, and into the base of the proximal phalanx  Proper positioning of the screw and joint was assessed under C-arm imaging was found to be excellent  Incision site was then irrigated with bulb syringe normal sterile saline  Deep layers were closed utilizing 3-0 Vicryl  Skin was reapproximated utilizing 4-0 nylon in horizontal mattress technique  The foot was then cleansed and dried the incision site was dressed with Adaptic, 4 x 4 gauze, Yudy, and Coban      The tourniquet was deflated at approximately 40 min and normal hyperemic response was noted to all digits  The patient tolerated the procedure and anesthesia well without immediate complications and transferred to PACU with vital signs stable  Dr Tomás Banerjee was present during the entire procedure and participated in all key aspects  Kalpesh Glez DPM  DATE: April 29, 2022  TIME: 1:38 PM      Portions of the record may have been created with voice recognition software  Occasional wrong word or "sound a like" substitutions may have occurred due to the inherent limitations of voice recognition software  Read the chart carefully and recognize, using context, where substitutions have occurred

## 2022-04-29 NOTE — DISCHARGE SUMMARY
Discharge Summary Outpatient Procedure Podiatry -   Maryan Brock 19 y o  male MRN: [id number]  Unit/Bed#: OR [place] Encounter: [id number]    Admission Date: 4/29/2022     Admitting Diagnosis: Hallux malleus of right foot [M20 31]  Chronic pain of toe, right [M79 674, G90 29]    Discharge Diagnosis: same    Procedures Performed: Fusion IPJ great toe:     Complications: none    Condition at Discharge: stable    Discharge instructions/Information to patient and family:   See after visit summary for information provided to patient and family  Provisions for Follow-Up Care/Important appointments:  See after visit summary for information related to follow-up care and any pertinent home health orders  Discharge Medications:  See after visit summary for reconciled discharge medications provided to patient and family

## 2022-05-02 ENCOUNTER — OFFICE VISIT (OUTPATIENT)
Dept: OBGYN CLINIC | Facility: MEDICAL CENTER | Age: 22
End: 2022-05-02

## 2022-05-02 VITALS
HEIGHT: 72 IN | WEIGHT: 143 LBS | BODY MASS INDEX: 19.37 KG/M2 | HEART RATE: 57 BPM | DIASTOLIC BLOOD PRESSURE: 83 MMHG | SYSTOLIC BLOOD PRESSURE: 151 MMHG

## 2022-05-02 DIAGNOSIS — M20.11 HALLUX ABDUCTO VALGUS, RIGHT: Primary | ICD-10-CM

## 2022-05-02 PROCEDURE — 99024 POSTOP FOLLOW-UP VISIT: CPT | Performed by: PODIATRIST

## 2022-05-02 NOTE — PROGRESS NOTES
Assessment/Plan:      Diagnoses and all orders for this visit:    Hallux abducto valgus, right      Patient is stable postop 1 weeks    Incision: stable, sutures intact    Dressing instructions given to patient  Rest the foot as much as possible and elevate/ice  if swollen  Ambulatory status: WB surgical shoe to heel    Images reviewed today: postop XR, stable    RTC: 1-2 weeks for suture removal  HE is doing well      Subjective:     Patient ID: Eriberto Villagran is a 19 y o  male  DOS: 4/29/2022     Procedure: right great toe IPJ fusion     Condition: Dressing C/D/I  PAinb was severe over weekend but improved since he added ibuprofen  HE feels well overall  Review of Systems      Objective:     Physical Exam  Vitals reviewed  Cardiovascular:      Pulses: Normal pulses  Neurological:      Mental Status: He is alert  Right foot incision over IPJ stable  CRT brisk  EHL intact  NO calf pain with compression  Correction of toe deformity maintained

## 2022-05-16 ENCOUNTER — OFFICE VISIT (OUTPATIENT)
Dept: OBGYN CLINIC | Facility: MEDICAL CENTER | Age: 22
End: 2022-05-16

## 2022-05-16 VITALS
BODY MASS INDEX: 19.37 KG/M2 | DIASTOLIC BLOOD PRESSURE: 96 MMHG | WEIGHT: 143 LBS | SYSTOLIC BLOOD PRESSURE: 138 MMHG | HEIGHT: 72 IN | HEART RATE: 80 BPM

## 2022-05-16 DIAGNOSIS — M20.11 HALLUX ABDUCTO VALGUS, RIGHT: Primary | ICD-10-CM

## 2022-05-16 PROCEDURE — 99024 POSTOP FOLLOW-UP VISIT: CPT | Performed by: PODIATRIST

## 2022-05-16 NOTE — PROGRESS NOTES
Assessment/Plan:      Diagnoses and all orders for this visit:    Hallux abducto valgus, right  -     XR foot 2 vw right; Future      Patient is stable postop 2 5 weeks    Incision: healed, sutures removed    Dressing instructions given to patient  Rest the foot as much as possible and elevate/ice  if swollen  Ambulatory status: WBAT surgical shoe for anohter 3-4 weeks    Images reviewed today: none    RTC: 4 weeks for xray  He is healing well  Subjective:     Patient ID: Nitin Devries is a 19 y o  male  DOS: 4/29/2022     Procedure: right great toe IPJ fusion     Condition: minimal pain  No acute concerns  Sutures intact      Review of Systems      Objective:     Physical Exam  Vitals reviewed  Constitutional:       Appearance: He is normal weight  Cardiovascular:      Rate and Rhythm: Normal rate  Pulses: Normal pulses  Pulmonary:      Effort: Pulmonary effort is normal  No respiratory distress  Musculoskeletal:      Right foot: Decreased range of motion (hallux IPJ fused, anatolically straight)  Feet:    Neurological:      Mental Status: He is alert

## 2022-06-21 ENCOUNTER — TELEPHONE (OUTPATIENT)
Dept: PODIATRY | Facility: CLINIC | Age: 22
End: 2022-06-21

## 2022-06-21 NOTE — TELEPHONE ENCOUNTER
Buddy Solis is back to work as of 6/13/22  He is completely healed and is at 100%  His job is requiring a release note stating that he is released to return to work as of 6/13/22  Full duty  Buddy Solis will call with a fax number

## 2022-06-21 NOTE — TELEPHONE ENCOUNTER
L/m informing ot he needs to make an appt to be cleared for full duty  I offered him a sameday slot for tomorrow @ 9:15am, he should call back if he wants this appt  Otherwise we have nothing until July

## 2022-06-22 NOTE — TELEPHONE ENCOUNTER
Patient called and LM on surgery line in regards to request for release papers  Per message from Dr Nargis Causey would need to have another appt to be finally cleared  Patient state that he will speak with his employer to see if they need any paperwork  Will call back if appt is needed

## 2024-05-26 ENCOUNTER — HOSPITAL ENCOUNTER (EMERGENCY)
Facility: HOSPITAL | Age: 24
Discharge: HOME | End: 2024-05-26
Attending: EMERGENCY MEDICINE | Admitting: EMERGENCY MEDICINE
Payer: COMMERCIAL

## 2024-05-26 VITALS
RESPIRATION RATE: 16 BRPM | WEIGHT: 147.71 LBS | BODY MASS INDEX: 20.03 KG/M2 | HEART RATE: 94 BPM | SYSTOLIC BLOOD PRESSURE: 137 MMHG | OXYGEN SATURATION: 93 % | DIASTOLIC BLOOD PRESSURE: 84 MMHG | TEMPERATURE: 98.2 F
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
## 2024-05-26 DIAGNOSIS — T14.8XXA ABRASION: ICD-10-CM

## 2024-05-26 LAB
ALBUMIN SERPL BCP-MCNC: 5 G/DL (ref 3.5–5)
ALP SERPL-CCNC: 98 U/L (ref 34–104)
ALT SERPL W P-5'-P-CCNC: 17 U/L (ref 7–52)
ANION GAP SERPL CALCULATED.3IONS-SCNC: 10 MMOL/L (ref 4–13)
AST SERPL W P-5'-P-CCNC: 28 U/L (ref 13–39)
BASOPHILS # BLD AUTO: 0.02 THOUSANDS/ÂΜL (ref 0–0.1)
BASOPHILS NFR BLD AUTO: 0 % (ref 0–1)
BILIRUB SERPL-MCNC: 0.36 MG/DL (ref 0.2–1)
BUN SERPL-MCNC: 12 MG/DL (ref 5–25)
CALCIUM SERPL-MCNC: 9 MG/DL (ref 8.4–10.2)
CHLORIDE SERPL-SCNC: 112 MMOL/L (ref 96–108)
CO2 SERPL-SCNC: 23 MMOL/L (ref 21–32)
CREAT SERPL-MCNC: 0.88 MG/DL (ref 0.6–1.3)
EOSINOPHIL # BLD AUTO: 0 THOUSAND/ÂΜL (ref 0–0.61)
EOSINOPHIL NFR BLD AUTO: 0 % (ref 0–6)
ERYTHROCYTE [DISTWIDTH] IN BLOOD BY AUTOMATED COUNT: 12.3 % (ref 11.6–15.1)
GFR SERPL CREATININE-BSD FRML MDRD: 119 ML/MIN/1.73SQ M
GLUCOSE SERPL-MCNC: 120 MG/DL (ref 65–140)
HCT VFR BLD AUTO: 42.1 % (ref 36.5–49.3)
HGB BLD-MCNC: 14.7 G/DL (ref 12–17)
IMM GRANULOCYTES # BLD AUTO: 0.02 THOUSAND/UL (ref 0–0.2)
IMM GRANULOCYTES NFR BLD AUTO: 0 % (ref 0–2)
LYMPHOCYTES # BLD AUTO: 1.03 THOUSANDS/ÂΜL (ref 0.6–4.47)
LYMPHOCYTES NFR BLD AUTO: 16 % (ref 14–44)
MCH RBC QN AUTO: 31.5 PG (ref 26.8–34.3)
MCHC RBC AUTO-ENTMCNC: 34.9 G/DL (ref 31.4–37.4)
MCV RBC AUTO: 90 FL (ref 82–98)
MONOCYTES # BLD AUTO: 0.45 THOUSAND/ÂΜL (ref 0.17–1.22)
MONOCYTES NFR BLD AUTO: 7 % (ref 4–12)
NEUTROPHILS # BLD AUTO: 5.11 THOUSANDS/ÂΜL (ref 1.85–7.62)
NEUTS SEG NFR BLD AUTO: 77 % (ref 43–75)
NRBC BLD AUTO-RTO: 0 /100 WBCS
PLATELET # BLD AUTO: 247 THOUSANDS/UL (ref 149–390)
PMV BLD AUTO: 9.9 FL (ref 8.9–12.7)
POTASSIUM SERPL-SCNC: 3.8 MMOL/L (ref 3.5–5.3)
PROT SERPL-MCNC: 7.8 G/DL (ref 6.4–8.4)
RBC # BLD AUTO: 4.67 MILLION/UL (ref 3.88–5.62)
SODIUM SERPL-SCNC: 145 MMOL/L (ref 135–147)
WBC # BLD AUTO: 6.63 THOUSAND/UL (ref 4.31–10.16)

## 2024-05-26 PROCEDURE — 99284 EMERGENCY DEPT VISIT MOD MDM: CPT | Performed by: EMERGENCY MEDICINE

## 2024-05-26 PROCEDURE — 90715 TDAP VACCINE 7 YRS/> IM: CPT

## 2024-05-26 PROCEDURE — 99284 EMERGENCY DEPT VISIT MOD MDM: CPT

## 2024-05-26 PROCEDURE — 96360 HYDRATION IV INFUSION INIT: CPT

## 2024-05-26 PROCEDURE — 80053 COMPREHEN METABOLIC PANEL: CPT

## 2024-05-26 PROCEDURE — 90471 IMMUNIZATION ADMIN: CPT

## 2024-05-26 PROCEDURE — 36415 COLL VENOUS BLD VENIPUNCTURE: CPT

## 2024-05-26 PROCEDURE — 85025 COMPLETE CBC W/AUTO DIFF WBC: CPT

## 2024-05-26 RX ORDER — GINSENG 100 MG
1 CAPSULE ORAL ONCE
Status: COMPLETED | OUTPATIENT
Start: 2024-05-26 | End: 2024-05-26

## 2024-05-26 RX ADMIN — TETANUS TOXOID, REDUCED DIPHTHERIA TOXOID AND ACELLULAR PERTUSSIS VACCINE, ADSORBED 0.5 ML: 5; 2.5; 8; 8; 2.5 SUSPENSION INTRAMUSCULAR at 07:19

## 2024-05-26 RX ADMIN — BACITRACIN 1 LARGE APPLICATION: 500 OINTMENT TOPICAL at 07:18

## 2024-05-26 RX ADMIN — SODIUM CHLORIDE 1000 ML: 0.9 INJECTION, SOLUTION INTRAVENOUS at 07:18

## 2024-05-26 NOTE — Clinical Note
Elder Velasco was seen and treated in our emergency department on 5/26/2024.                Diagnosis:     Elder  .    He may return on this date: 05/28/2024         If you have any questions or concerns, please don't hesitate to call.      Jayashree Price MD    ______________________________           _______________          _______________  Hospital Representative                              Date                                Time
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
## 2025-05-28 ENCOUNTER — TELEPHONE (OUTPATIENT)
Age: 25
End: 2025-05-28

## (undated) DEVICE — BETHLEHEM UNIVERSAL  MIONR EXT: Brand: CARDINAL HEALTH

## (undated) DEVICE — SUT ETHILON 4-0 PS-2 18 IN 1667H

## (undated) DEVICE — NEEDLE 25G X 1 1/2

## (undated) DEVICE — GLOVE SRG BIOGEL 7.5

## (undated) DEVICE — SPONGE LAP 18 X 18 IN STRL RFD

## (undated) DEVICE — WEBRIL 6 IN UNSTERILE

## (undated) DEVICE — ACE WRAP 4 IN UNSTERILE

## (undated) DEVICE — STERILE POLYISOPRENE POWDER-FREE SURGICAL GLOVES WITH EMOLLIENT COATING: Brand: PROTEXIS

## (undated) DEVICE — THIN OFFSET (9.0 X 0.38 X 25.0MM)

## (undated) DEVICE — COBAN 4 IN STERILE

## (undated) DEVICE — CUFF TOURNIQUET 18 X 4 IN QUICK CONNECT DISP 1 BLADDER

## (undated) DEVICE — SINGLE PORT MANIFOLD: Brand: NEPTUNE 2

## (undated) DEVICE — GUIDEWIRE 0.045 IN TROCAR TIP

## (undated) DEVICE — INTENDED FOR TISSUE SEPARATION, AND OTHER PROCEDURES THAT REQUIRE A SHARP SURGICAL BLADE TO PUNCTURE OR CUT.: Brand: BARD-PARKER SAFETY BLADES SIZE 15, STERILE

## (undated) DEVICE — SYRINGE 10ML LL

## (undated) DEVICE — SILVER-COATED ANTIMICROBIAL BARRIER DRESSING: Brand: ACTICOAT FLEX3 4" X 4"

## (undated) DEVICE — GAUZE SPONGES,16 PLY: Brand: CURITY

## (undated) DEVICE — CURITY NON-ADHERENT STRIPS: Brand: CURITY

## (undated) DEVICE — CURITY STRETCH BANDAGE: Brand: CURITY

## (undated) DEVICE — CHLORAPREP HI-LITE 26ML ORANGE

## (undated) DEVICE — ASTOUND STANDARD SURGICAL GOWN, XL: Brand: CONVERTORS

## (undated) DEVICE — PLUMEPEN PRO 10FT

## (undated) DEVICE — SCD SEQUENTIAL COMPRESSION COMFORT SLEEVE MEDIUM KNEE LENGTH: Brand: KENDALL SCD

## (undated) DEVICE — PADDING CAST 4 IN  COTTON STRL

## (undated) DEVICE — STERILE POLYISOPRENE POWDER-FREE SURGICAL GLOVES: Brand: PROTEXIS

## (undated) DEVICE — DRAPE C-ARM X-RAY

## (undated) DEVICE — INTENDED FOR TISSUE SEPARATION, AND OTHER PROCEDURES THAT REQUIRE A SHARP SURGICAL BLADE TO PUNCTURE OR CUT.: Brand: BARD-PARKER ® CARBON RIB-BACK BLADES

## (undated) DEVICE — PENCIL ELECTROSURG E-Z CLEAN -0035H

## (undated) DEVICE — GUIDEWIRE 0.45IN  DBL ENDED TROCAR TIP

## (undated) DEVICE — TUBING SUCTION 5MM X 12 FT

## (undated) DEVICE — 10FR FRAZIER SUCTION HANDLE: Brand: CARDINAL HEALTH

## (undated) DEVICE — CUFF TOURNIQUET 30 X 4 IN QUICK CONNECT DISP 1BLA

## (undated) DEVICE — DRILL BIT

## (undated) DEVICE — INTENDED FOR TISSUE SEPARATION, AND OTHER PROCEDURES THAT REQUIRE A SHARP SURGICAL BLADE TO PUNCTURE OR CUT.: Brand: BARD-PARKER ® SAFETYLOCK CARBON RIB-BACK BLADES

## (undated) DEVICE — 30.0 MM X 9.5 MM X 0.60 MM SAGITTAL BLADE

## (undated) DEVICE — Device

## (undated) DEVICE — 2000CC GUARDIAN II: Brand: GUARDIAN

## (undated) DEVICE — SUT VICRYL 3-0 SH 27 IN J416H

## (undated) DEVICE — KERLIX BANDAGE ROLL: Brand: KERLIX

## (undated) DEVICE — 3M™ DURAPORE™ SURGICAL TAPE 1538-3, 3 INCH X 10 YARD (7,5CM X 9,1M), 4 ROLLS/BOX: Brand: 3M™ DURAPORE™

## (undated) DEVICE — WIRE 0.86MM  DBL ENDED TROCAR TIP
Type: IMPLANTABLE DEVICE | Site: TOE | Status: NON-FUNCTIONAL
Removed: 2022-04-29

## (undated) DEVICE — GLOVE INDICATOR PI UNDERGLOVE SZ 7.5 BLUE

## (undated) DEVICE — SUT MONOCRYL 4-0 PS-2 27 IN Y426H